# Patient Record
Sex: MALE | Race: WHITE | URBAN - METROPOLITAN AREA
[De-identification: names, ages, dates, MRNs, and addresses within clinical notes are randomized per-mention and may not be internally consistent; named-entity substitution may affect disease eponyms.]

---

## 2017-03-26 ENCOUNTER — INPATIENT (INPATIENT)
Facility: HOSPITAL | Age: 72
LOS: 4 days | Discharge: HOME | End: 2017-03-31
Attending: INTERNAL MEDICINE | Admitting: INTERNAL MEDICINE
Payer: MEDICARE

## 2017-03-26 VITALS
OXYGEN SATURATION: 98 % | SYSTOLIC BLOOD PRESSURE: 133 MMHG | HEART RATE: 71 BPM | HEIGHT: 69 IN | WEIGHT: 179.9 LBS | DIASTOLIC BLOOD PRESSURE: 76 MMHG | RESPIRATION RATE: 22 BRPM

## 2017-03-26 DIAGNOSIS — I26.99 OTHER PULMONARY EMBOLISM WITHOUT ACUTE COR PULMONALE: ICD-10-CM

## 2017-03-26 DIAGNOSIS — I48.91 UNSPECIFIED ATRIAL FIBRILLATION: ICD-10-CM

## 2017-03-26 LAB
ALBUMIN SERPL ELPH-MCNC: 3.3 G/DL — SIGNIFICANT CHANGE UP (ref 3.3–5)
ALP SERPL-CCNC: 72 U/L — SIGNIFICANT CHANGE UP (ref 40–120)
ALT FLD-CCNC: 50 U/L — SIGNIFICANT CHANGE UP (ref 12–78)
ANION GAP SERPL CALC-SCNC: 7 MMOL/L — SIGNIFICANT CHANGE UP (ref 5–17)
APPEARANCE UR: ABNORMAL
APTT BLD: 46.1 SEC — HIGH (ref 27.5–37.4)
APTT BLD: 61.8 SEC — HIGH (ref 27.5–37.4)
AST SERPL-CCNC: 31 U/L — SIGNIFICANT CHANGE UP (ref 15–37)
BACTERIA # UR AUTO: ABNORMAL
BASE EXCESS BLDA CALC-SCNC: 0.8 MMOL/L — SIGNIFICANT CHANGE UP (ref -2–2)
BASOPHILS # BLD AUTO: 0.1 K/UL — SIGNIFICANT CHANGE UP (ref 0–0.2)
BASOPHILS NFR BLD AUTO: 1 % — SIGNIFICANT CHANGE UP (ref 0–2)
BILIRUB SERPL-MCNC: 0.7 MG/DL — SIGNIFICANT CHANGE UP (ref 0.2–1.2)
BILIRUB UR-MCNC: NEGATIVE — SIGNIFICANT CHANGE UP
BLOOD GAS COMMENTS: SIGNIFICANT CHANGE UP
BLOOD GAS COMMENTS: SIGNIFICANT CHANGE UP
BLOOD GAS SOURCE: SIGNIFICANT CHANGE UP
BUN SERPL-MCNC: 26 MG/DL — HIGH (ref 7–23)
CALCIUM SERPL-MCNC: 8.5 MG/DL — SIGNIFICANT CHANGE UP (ref 8.5–10.1)
CHLORIDE SERPL-SCNC: 109 MMOL/L — HIGH (ref 96–108)
CK MB CFR SERPL CALC: 1.9 NG/ML — SIGNIFICANT CHANGE UP (ref 0.5–3.6)
CO2 SERPL-SCNC: 28 MMOL/L — SIGNIFICANT CHANGE UP (ref 22–31)
COLOR SPEC: YELLOW — SIGNIFICANT CHANGE UP
CREAT SERPL-MCNC: 1.12 MG/DL — SIGNIFICANT CHANGE UP (ref 0.5–1.3)
D DIMER BLD IA.RAPID-MCNC: 344 NG/ML DDU — HIGH
DIFF PNL FLD: ABNORMAL
DIGOXIN SERPL-MCNC: 2.16 NG/ML — HIGH (ref 0.8–2)
EOSINOPHIL # BLD AUTO: 0.1 K/UL — SIGNIFICANT CHANGE UP (ref 0–0.5)
EOSINOPHIL NFR BLD AUTO: 1.2 % — SIGNIFICANT CHANGE UP (ref 0–6)
EPI CELLS # UR: SIGNIFICANT CHANGE UP
GLUCOSE SERPL-MCNC: 106 MG/DL — HIGH (ref 70–99)
GLUCOSE UR QL: NEGATIVE MG/DL — SIGNIFICANT CHANGE UP
HCO3 BLDA-SCNC: 24 MMOL/L — SIGNIFICANT CHANGE UP (ref 21–29)
HCT VFR BLD CALC: 48.3 % — SIGNIFICANT CHANGE UP (ref 39–50)
HCT VFR BLD CALC: 48.7 % — SIGNIFICANT CHANGE UP (ref 39–50)
HGB BLD-MCNC: 16.1 G/DL — SIGNIFICANT CHANGE UP (ref 13–17)
HGB BLD-MCNC: 16.3 G/DL — SIGNIFICANT CHANGE UP (ref 13–17)
HOROWITZ INDEX BLDA+IHG-RTO: 21 — SIGNIFICANT CHANGE UP
INR BLD: 2.29 RATIO — HIGH (ref 0.88–1.16)
KETONES UR-MCNC: NEGATIVE — SIGNIFICANT CHANGE UP
LEUKOCYTE ESTERASE UR-ACNC: NEGATIVE — SIGNIFICANT CHANGE UP
LYMPHOCYTES # BLD AUTO: 2.1 K/UL — SIGNIFICANT CHANGE UP (ref 1–3.3)
LYMPHOCYTES # BLD AUTO: 25.9 % — SIGNIFICANT CHANGE UP (ref 13–44)
MAGNESIUM SERPL-MCNC: 2.3 MG/DL — SIGNIFICANT CHANGE UP (ref 1.8–2.4)
MCHC RBC-ENTMCNC: 30.6 PG — SIGNIFICANT CHANGE UP (ref 27–34)
MCHC RBC-ENTMCNC: 31.4 PG — SIGNIFICANT CHANGE UP (ref 27–34)
MCHC RBC-ENTMCNC: 33.1 GM/DL — SIGNIFICANT CHANGE UP (ref 32–36)
MCHC RBC-ENTMCNC: 33.8 GM/DL — SIGNIFICANT CHANGE UP (ref 32–36)
MCV RBC AUTO: 92.5 FL — SIGNIFICANT CHANGE UP (ref 80–100)
MCV RBC AUTO: 92.9 FL — SIGNIFICANT CHANGE UP (ref 80–100)
MONOCYTES # BLD AUTO: 0.6 K/UL — SIGNIFICANT CHANGE UP (ref 0–0.9)
MONOCYTES NFR BLD AUTO: 7.1 % — SIGNIFICANT CHANGE UP (ref 2–14)
NEUTROPHILS # BLD AUTO: 5.2 K/UL — SIGNIFICANT CHANGE UP (ref 1.8–7.4)
NEUTROPHILS NFR BLD AUTO: 64.8 % — SIGNIFICANT CHANGE UP (ref 43–77)
NITRITE UR-MCNC: NEGATIVE — SIGNIFICANT CHANGE UP
NT-PROBNP SERPL-SCNC: 4848 PG/ML — HIGH (ref 0–125)
PCO2 BLDA: 35 MMHG — SIGNIFICANT CHANGE UP (ref 32–46)
PH BLD: 7.45 — SIGNIFICANT CHANGE UP (ref 7.35–7.45)
PH UR: 6 — SIGNIFICANT CHANGE UP (ref 4.8–8)
PLATELET # BLD AUTO: 188 K/UL — SIGNIFICANT CHANGE UP (ref 150–400)
PLATELET # BLD AUTO: 190 K/UL — SIGNIFICANT CHANGE UP (ref 150–400)
PO2 BLDA: 76 MMHG — SIGNIFICANT CHANGE UP (ref 74–108)
POTASSIUM SERPL-MCNC: 4 MMOL/L — SIGNIFICANT CHANGE UP (ref 3.5–5.3)
POTASSIUM SERPL-SCNC: 4 MMOL/L — SIGNIFICANT CHANGE UP (ref 3.5–5.3)
PROT SERPL-MCNC: 6.3 GM/DL — SIGNIFICANT CHANGE UP (ref 6–8.3)
PROT UR-MCNC: 30 MG/DL
PROTHROM AB SERPL-ACNC: 25.4 SEC — HIGH (ref 9.8–12.7)
RBC # BLD: 5.2 M/UL — SIGNIFICANT CHANGE UP (ref 4.2–5.8)
RBC # BLD: 5.26 M/UL — SIGNIFICANT CHANGE UP (ref 4.2–5.8)
RBC # FLD: 13 % — SIGNIFICANT CHANGE UP (ref 11–15)
RBC # FLD: 13.2 % — SIGNIFICANT CHANGE UP (ref 11–15)
RBC CASTS # UR COMP ASSIST: >50 /HPF (ref 0–4)
SAO2 % BLDA: 95 % — SIGNIFICANT CHANGE UP (ref 92–96)
SODIUM SERPL-SCNC: 144 MMOL/L — SIGNIFICANT CHANGE UP (ref 135–145)
SP GR SPEC: 1.02 — SIGNIFICANT CHANGE UP (ref 1.01–1.02)
TROPONIN I SERPL-MCNC: 0.05 NG/ML — HIGH (ref 0.01–0.04)
UROBILINOGEN FLD QL: NEGATIVE MG/DL — SIGNIFICANT CHANGE UP
WBC # BLD: 7.4 K/UL — SIGNIFICANT CHANGE UP (ref 3.8–10.5)
WBC # BLD: 8 K/UL — SIGNIFICANT CHANGE UP (ref 3.8–10.5)
WBC # FLD AUTO: 7.4 K/UL — SIGNIFICANT CHANGE UP (ref 3.8–10.5)
WBC # FLD AUTO: 8 K/UL — SIGNIFICANT CHANGE UP (ref 3.8–10.5)
WBC UR QL: SIGNIFICANT CHANGE UP

## 2017-03-26 PROCEDURE — 71275 CT ANGIOGRAPHY CHEST: CPT | Mod: 26

## 2017-03-26 PROCEDURE — 99291 CRITICAL CARE FIRST HOUR: CPT

## 2017-03-26 PROCEDURE — 71010: CPT | Mod: 26

## 2017-03-26 RX ORDER — DIGOXIN 250 MCG
0.25 TABLET ORAL DAILY
Qty: 0 | Refills: 0 | Status: DISCONTINUED | OUTPATIENT
Start: 2017-03-26 | End: 2017-03-26

## 2017-03-26 RX ORDER — ATORVASTATIN CALCIUM 80 MG/1
20 TABLET, FILM COATED ORAL AT BEDTIME
Qty: 0 | Refills: 0 | Status: DISCONTINUED | OUTPATIENT
Start: 2017-03-26 | End: 2017-03-31

## 2017-03-26 RX ORDER — HEPARIN SODIUM 5000 [USP'U]/ML
3000 INJECTION INTRAVENOUS; SUBCUTANEOUS EVERY 6 HOURS
Qty: 0 | Refills: 0 | Status: DISCONTINUED | OUTPATIENT
Start: 2017-03-26 | End: 2017-03-26

## 2017-03-26 RX ORDER — HEPARIN SODIUM 5000 [USP'U]/ML
6500 INJECTION INTRAVENOUS; SUBCUTANEOUS EVERY 6 HOURS
Qty: 0 | Refills: 0 | Status: DISCONTINUED | OUTPATIENT
Start: 2017-03-26 | End: 2017-03-26

## 2017-03-26 RX ORDER — CLOPIDOGREL BISULFATE 75 MG/1
75 TABLET, FILM COATED ORAL DAILY
Qty: 0 | Refills: 0 | Status: DISCONTINUED | OUTPATIENT
Start: 2017-03-26 | End: 2017-03-29

## 2017-03-26 RX ORDER — HEPARIN SODIUM 5000 [USP'U]/ML
INJECTION INTRAVENOUS; SUBCUTANEOUS
Qty: 25000 | Refills: 0 | Status: DISCONTINUED | OUTPATIENT
Start: 2017-03-26 | End: 2017-03-26

## 2017-03-26 RX ORDER — RIVAROXABAN 15 MG-20MG
20 KIT ORAL DAILY
Qty: 0 | Refills: 0 | Status: DISCONTINUED | OUTPATIENT
Start: 2017-03-26 | End: 2017-03-31

## 2017-03-26 RX ORDER — ASPIRIN/CALCIUM CARB/MAGNESIUM 324 MG
81 TABLET ORAL DAILY
Qty: 0 | Refills: 0 | Status: DISCONTINUED | OUTPATIENT
Start: 2017-03-26 | End: 2017-03-31

## 2017-03-26 RX ORDER — LISINOPRIL 2.5 MG/1
2.5 TABLET ORAL DAILY
Qty: 0 | Refills: 0 | Status: DISCONTINUED | OUTPATIENT
Start: 2017-03-26 | End: 2017-03-31

## 2017-03-26 RX ORDER — ALPRAZOLAM 0.25 MG
0.25 TABLET ORAL DAILY
Qty: 0 | Refills: 0 | Status: DISCONTINUED | OUTPATIENT
Start: 2017-03-26 | End: 2017-03-31

## 2017-03-26 RX ORDER — HEPARIN SODIUM 5000 [USP'U]/ML
6500 INJECTION INTRAVENOUS; SUBCUTANEOUS ONCE
Qty: 0 | Refills: 0 | Status: COMPLETED | OUTPATIENT
Start: 2017-03-26 | End: 2017-03-26

## 2017-03-26 RX ORDER — CARVEDILOL PHOSPHATE 80 MG/1
6.25 CAPSULE, EXTENDED RELEASE ORAL EVERY 12 HOURS
Qty: 0 | Refills: 0 | Status: DISCONTINUED | OUTPATIENT
Start: 2017-03-26 | End: 2017-03-30

## 2017-03-26 RX ADMIN — CLOPIDOGREL BISULFATE 75 MILLIGRAM(S): 75 TABLET, FILM COATED ORAL at 18:37

## 2017-03-26 RX ADMIN — CARVEDILOL PHOSPHATE 6.25 MILLIGRAM(S): 80 CAPSULE, EXTENDED RELEASE ORAL at 18:37

## 2017-03-26 RX ADMIN — HEPARIN SODIUM 6500 UNIT(S): 5000 INJECTION INTRAVENOUS; SUBCUTANEOUS at 13:54

## 2017-03-26 RX ADMIN — HEPARIN SODIUM 1500 UNIT(S)/HR: 5000 INJECTION INTRAVENOUS; SUBCUTANEOUS at 13:57

## 2017-03-26 RX ADMIN — LISINOPRIL 2.5 MILLIGRAM(S): 2.5 TABLET ORAL at 18:37

## 2017-03-26 RX ADMIN — Medication 0.25 MILLIGRAM(S): at 18:37

## 2017-03-26 RX ADMIN — Medication 81 MILLIGRAM(S): at 21:21

## 2017-03-26 NOTE — ED PROVIDER NOTE - PHYSICAL EXAMINATION
Gen: Alert, NAD  Head: NC, AT   Eyes: PERRL, EOMI, normal lids/conjunctiva  ENT: normal hearing, patent oropharynx without erythema/exudate, uvula midline  Neck: supple, no tenderness, Trachea midline  Pulm: Bilateral BS, normal resp effort, no wheeze/stridor/retractions  CV: irregular, tachycardic. distal pulses intact   Abd: soft, NT/ND, +BS, no hepatosplenomegaly  Mskel: extremities x4 with normal ROM and no joint effusions. no ctl spine ttp.   Skin: no rash, no bruising   Neuro: AAOx3, no sensory/motor deficits, CN 2-12 intact

## 2017-03-26 NOTE — ED ADULT NURSE REASSESSMENT NOTE - NS ED NURSE REASSESS COMMENT FT1
Heparin discontinued and PO medications given. Pt remains on the monitor Sinus Tachycardia  pain or SOB noted at this time

## 2017-03-26 NOTE — H&P ADULT. - HISTORY OF PRESENT ILLNESS
72m hx of afib noncompliant with xarelto and cad not on any meds pw sob that he has had for 2 years that is steadily worsening. he denies cough, fever, or other uri symptoms. No cp or abd pain. Feels particularly worse when he bends over.   Fh and Sh not otherwise contributory

## 2017-03-26 NOTE — ED ADULT NURSE NOTE - OBJECTIVE STATEMENT
can't breathe, and Monday I went to see my doctor and he gave me zyrtec, Claritin and Flonase He was having chest pains but has resolved. He has a history of Atrial Fibrillation and Stent x1 .He was taking Plavix but stop for 6 to 7 months . The started Xarelto. He can't  breath reason for today visit

## 2017-03-26 NOTE — ED ADULT NURSE NOTE - CHIEF COMPLAINT QUOTE
"  I have been feeling like that for about 2 yeas, I can't breathe, and Monday I went to see my doctor and he gave me zyrtec, claritin and flonase and this morning I got up and I felt horrible, I was here 2 years for the same reasons that I am here today and they took to [facility] and I got a cardiac stent, they told me that I got afib, I have a little bit of chest pain now"

## 2017-03-26 NOTE — ED PROVIDER NOTE - MEDICAL DECISION MAKING DETAILS
patient pw afib w rvr. no clear compliance with ac. will need to start dilt to bring rate down. I suspect that is the nature of sob. will give ac. will admit to telemetry. patient pw afib w rvr. no clear compliance with ac. will need to start dilt to bring rate down. I suspect that is the nature of sob. will give ac. cta shows PE. INR already elevated. Will will admit to telemetry.

## 2017-03-26 NOTE — ED PROVIDER NOTE - CARE PLAN
Principal Discharge DX:	Persistent atrial fibrillation Principal Discharge DX:	Persistent atrial fibrillation  Secondary Diagnosis:	Other acute pulmonary embolism without acute cor pulmonale

## 2017-03-26 NOTE — ED ADULT TRIAGE NOTE - CHIEF COMPLAINT QUOTE
"  I have been feeling like that for about 2 yeas, I can't breathe, and Monday I went to see my doctor and he gave me zyrtec, claritin and flonase and this morning I got up and I felt horrible, I was here 2 years for the same reasons that I am here today and they took to [facility] and I got a cardiac stent, they told me that I got afib, I have a little bit of chest pain now"

## 2017-03-27 LAB
ANION GAP SERPL CALC-SCNC: 9 MMOL/L — SIGNIFICANT CHANGE UP (ref 5–17)
BUN SERPL-MCNC: 23 MG/DL — SIGNIFICANT CHANGE UP (ref 7–23)
CALCIUM SERPL-MCNC: 8.2 MG/DL — LOW (ref 8.5–10.1)
CHLORIDE SERPL-SCNC: 108 MMOL/L — SIGNIFICANT CHANGE UP (ref 96–108)
CO2 SERPL-SCNC: 28 MMOL/L — SIGNIFICANT CHANGE UP (ref 22–31)
CREAT SERPL-MCNC: 0.98 MG/DL — SIGNIFICANT CHANGE UP (ref 0.5–1.3)
GLUCOSE SERPL-MCNC: 105 MG/DL — HIGH (ref 70–99)
HCT VFR BLD CALC: 43.2 % — SIGNIFICANT CHANGE UP (ref 39–50)
HGB BLD-MCNC: 14.8 G/DL — SIGNIFICANT CHANGE UP (ref 13–17)
MAGNESIUM SERPL-MCNC: 2.4 MG/DL — SIGNIFICANT CHANGE UP (ref 1.8–2.4)
MCHC RBC-ENTMCNC: 31.5 PG — SIGNIFICANT CHANGE UP (ref 27–34)
MCHC RBC-ENTMCNC: 34.3 GM/DL — SIGNIFICANT CHANGE UP (ref 32–36)
MCV RBC AUTO: 91.9 FL — SIGNIFICANT CHANGE UP (ref 80–100)
PHOSPHATE SERPL-MCNC: 3.2 MG/DL — SIGNIFICANT CHANGE UP (ref 2.5–4.5)
PLATELET # BLD AUTO: 175 K/UL — SIGNIFICANT CHANGE UP (ref 150–400)
POTASSIUM SERPL-MCNC: 3.8 MMOL/L — SIGNIFICANT CHANGE UP (ref 3.5–5.3)
POTASSIUM SERPL-SCNC: 3.8 MMOL/L — SIGNIFICANT CHANGE UP (ref 3.5–5.3)
RBC # BLD: 4.7 M/UL — SIGNIFICANT CHANGE UP (ref 4.2–5.8)
RBC # FLD: 13 % — SIGNIFICANT CHANGE UP (ref 11–15)
SODIUM SERPL-SCNC: 145 MMOL/L — SIGNIFICANT CHANGE UP (ref 135–145)
TROPONIN I SERPL-MCNC: 0.04 NG/ML — SIGNIFICANT CHANGE UP (ref 0.01–0.04)
WBC # BLD: 6.4 K/UL — SIGNIFICANT CHANGE UP (ref 3.8–10.5)
WBC # FLD AUTO: 6.4 K/UL — SIGNIFICANT CHANGE UP (ref 3.8–10.5)

## 2017-03-27 RX ADMIN — Medication 81 MILLIGRAM(S): at 11:38

## 2017-03-27 RX ADMIN — ATORVASTATIN CALCIUM 20 MILLIGRAM(S): 80 TABLET, FILM COATED ORAL at 22:18

## 2017-03-27 RX ADMIN — CARVEDILOL PHOSPHATE 6.25 MILLIGRAM(S): 80 CAPSULE, EXTENDED RELEASE ORAL at 06:01

## 2017-03-27 RX ADMIN — RIVAROXABAN 20 MILLIGRAM(S): KIT at 12:00

## 2017-03-27 RX ADMIN — LISINOPRIL 2.5 MILLIGRAM(S): 2.5 TABLET ORAL at 06:01

## 2017-03-27 RX ADMIN — CLOPIDOGREL BISULFATE 75 MILLIGRAM(S): 75 TABLET, FILM COATED ORAL at 11:38

## 2017-03-27 NOTE — PROGRESS NOTE ADULT - SUBJECTIVE AND OBJECTIVE BOX
Patient is a 72y old  Male who presents with a chief complaint of dyspnea (26 Mar 2017 16:59)  PE , subsegmental on CTA of chest. still has chest "congestion"    INTERVAL HPI/OVERNIGHT EVENTS: uneventful. .     MEDICATIONS  (STANDING):  aspirin enteric coated 81milliGRAM(s) Oral daily  lisinopril 2.5milliGRAM(s) Oral daily  diltiazem    Tablet 60milliGRAM(s) Oral three times a day  rivaroxaban 20milliGRAM(s) Oral daily  atorvastatin 20milliGRAM(s) Oral at bedtime  clopidogrel Tablet 75milliGRAM(s) Oral daily  carvedilol 6.25milliGRAM(s) Oral every 12 hours    MEDICATIONS  (PRN):  ALPRAZolam 0.25milliGRAM(s) Oral daily PRN anxiety      Allergies    No Known Allergies    Intolerances        REVIEW OF SYSTEMS:  CONSTITUTIONAL: No fever, weight loss, or fatigue  EYES: No eye pain, visual disturbances, or discharge  ENMT:  No difficulty hearing, tinnitus, vertigo; No sinus or throat pain  NECK: No pain or stiffness  BREASTS: No pain, masses, or nipple discharge  RESPIRATORY: No cough, wheezing, chills or hemoptysis; No shortness of breath  CARDIOVASCULAR: No chest pain, palpitations, dizziness, or leg swelling  GASTROINTESTINAL: No abdominal or epigastric pain. No nausea, vomiting, or hematemesis; No diarrhea or constipation. No melena or hematochezia.  GENITOURINARY: No dysuria, frequency, hematuria, or incontinence  NEUROLOGICAL: No headaches, memory loss, loss of strength, numbness, or tremors  SKIN: No itching, burning, rashes, or lesions   LYMPH NODES: No enlarged glands  ENDOCRINE: No heat or cold intolerance; No hair loss  MUSCULOSKELETAL: No joint pain or swelling; No muscle, back, or extremity pain  PSYCHIATRIC: No depression, anxiety, mood swings, or difficulty sleeping  HEME/LYMPH: No easy bruising, or bleeding gums  ALLERY AND IMMUNOLOGIC: No hives or eczema    Vital Signs Last 24 Hrs  T(C): 36.4, Max: 37 ( @ 00:21)  T(F): 97.5, Max: 98.6 ( @ 00:21)  HR: 72 (51 - 117)  BP: 96/61 (96/61 - 151/51)  BP(mean): --  RR: 17 (17 - 23)  SpO2: 90% (90% - 99%)    PHYSICAL EXAM:  GENERAL: NAD, well-groomed, well-developed  HEAD:  Atraumatic, Normocephalic  EYES: EOMI, PERRLA, conjunctiva and sclera clear  ENMT: No tonsillar erythema, exudates, or enlargement; Moist mucous membranes, Good dentition, No lesions  NECK: Supple, No JVD, Normal thyroid  NERVOUS SYSTEM:  Alert & Oriented X3, Good concentration; Motor Strength 5/5 B/L upper and lower extremities; DTRs 2+ intact and symmetric  CHEST/LUNG: Clear to percussion bilaterally; No rales, rhonchi, wheezing, or rubs  HEART: Regular rate and rhythm; No murmurs, rubs, or gallops  ABDOMEN: Soft, Nontender, Nondistended; Bowel sounds present  EXTREMITIES:  2+ Peripheral Pulses, No clubbing, cyanosis, or edema  LYMPH: No lymphadenopathy noted  SKIN: No rashes or lesions    LABS:                        14.8   6.4   )-----------( 175      ( 27 Mar 2017 06:14 )             43.2     27 Mar 2017 06:14    145    |  108    |  23     ----------------------------<  105    3.8     |  28     |  0.98     Ca    8.2        27 Mar 2017 06:14  Phos  3.2       27 Mar 2017 06:14  Mg     2.4       27 Mar 2017 06:14    TPro  6.3    /  Alb  3.3    /  TBili  0.7    /  DBili  x      /  AST  31     /  ALT  50     /  AlkPhos  72     26 Mar 2017 13:47      PT/INR - ( 26 Mar 2017 13:47 )   PT: 25.4 sec;   INR: 2.29 ratio         PTT - ( 26 Mar 2017 21:09 )  PTT:61.8 sec  Urinalysis Basic - ( 26 Mar 2017 15:34 )    Color: Yellow / Appearance: Slightly Turbid / S.020 / pH: x  Gluc: x / Ketone: Negative  / Bili: Negative / Urobili: Negative mg/dL   Blood: x / Protein: 30 mg/dL / Nitrite: Negative   Leuk Esterase: Negative / RBC: >50 /HPF / WBC 0-2   Sq Epi: x / Non Sq Epi: Occasional / Bacteria: Few      CAPILLARY BLOOD GLUCOSE    ABG - ( 26 Mar 2017 13:49 )  pH: x     /  pCO2: 35    /  pO2: 76    / HCO3: 24    / Base Excess: 0.8   /  SaO2: 95                CARDIAC MARKERS ( 27 Mar 2017 06:14 )  .043 ng/mL / x     / x     / x     / x      CARDIAC MARKERS ( 26 Mar 2017 13:47 )  .048 ng/mL / x     / x     / x     / 1.9 ng/mL            RADIOLOGY & ADDITIONAL TESTS:    Imaging Personally Reviewed:  [ ] YES  [ ] NO    Consultant(s) Notes Reviewed:  [ ] YES  [ ] NO    Care Discussed with Consultants/Other Providers [ ] YES  [ ] NO    Care discussed with family,         [  ]   yes  [  ]  No    imp:    stable[ ]    unstable[  ]     improving [   ]       unchanged  [  ]                Plans:  Continue present plans  [ x ] continue ACT               New consult [  ]   specialty  ......Pulmonary consult.               order test[  ]    test name.                  Discharge Planning  [  ]

## 2017-03-27 NOTE — CHART NOTE - NSCHARTNOTEFT_GEN_A_CORE
Medicine Hospitalist PA    Asked to see pt by RN for sob. Pt was seen and examined at bedside eating breakfast comfortably. Pt states he only feels sob when laying down. He states he feels better while sitting in a chair and walking. Pt states he has no sob at this moment and feels better. He refuses oxygen via NC, he states it is uncomfortable however the importance of oxygen was explained to pt and he states he will wear it after breakfast. Pt denies cp, sob, fever. N/V, palpitations.     VS: BP: 101/72  HR: 87  O2: 96    General: No distress, alert, awake and oriented x 3  CV: S1 S2+, irreg  Resp: CTA b/l    A/P: Continue ambulation  - Continue oxygen via NC  - Continue to monitor

## 2017-03-27 NOTE — CHART NOTE - NSCHARTNOTEFT_GEN_A_CORE
Hospitalist Medicine PA    Called by RN to assess patient for anxiety. Patient seen and examined. Patient states he does not regularly take xanax or other anxiety medications and does not wish to at this time. Patient is unable to describe clearly what he feels, but states that for the past few months at 2 in the morning he feels like he cannot sit still and must get up to walk around.   Patient also states he has not been taking Xarelto as prescribed since 2 years ago as well as ASA/Plavix and Lipitor.   Denies CP, palpitations, sob.    General: Patient was seen sitting in chair, comfortable, in NAD.   Lungs: CTA b/l  Heart: s1s2, irreg.    Discussed with patient the importance of continuing to take these medications for his heart.  Will continue to monitor patient.  -continue medical management/supportive care.

## 2017-03-27 NOTE — PHARMACY COMMUNICATION NOTE - COMMENTS
Spoke with MD about Xarelto induction dose (15mg twice daily) versus 20mg oral daily since patient presents with acute PE/non-compliance. Clarified the dosing regimen, and MD wants to continue xarelto 20mg PO daily.

## 2017-03-27 NOTE — PROGRESS NOTE ADULT - SUBJECTIVE AND OBJECTIVE BOX
INTERVAL HPI/OVERNIGHT EVENTS:  72m hx of FABIANAfib noncompliant with xarelto and CAD with stent,  not on any meds came with sob that he has had for 2 years. That is steadily worsening for last few weeks. Reports having Orthopnea. Denies any fever , chills. Some times has chest discomfort. Admitted with PE.  Comfortable in bed , complains of exertional sob.    Vital Signs Last 24 Hrs  T(C): 36.1, Max: 37 ( @ 00:21)  T(F): 97, Max: 98.6 ( @ 00:21)  HR: 80 (62 - 117)  BP: 133/72 (92/56 - 151/51)  BP(mean): --  RR: 19 (17 - 19)  SpO2: 97% (90% - 99%)    PHYSICAL EXAM:  GEN:        Awake, responsive and comfortable.  HEENT:    Normal.    RESP:      no wheezing  CVS:         Regular rate and rhythm.   ABD:         Soft, non-tender, non-distended;   :             No costovertebral angle tenderness  EXTR:            No clubbing, cyanosis or edema  CNS:              Intact sensory and motor function.    MEDICATIONS  (STANDING):  aspirin enteric coated 81milliGRAM(s) Oral daily  lisinopril 2.5milliGRAM(s) Oral daily  diltiazem    Tablet 60milliGRAM(s) Oral three times a day  rivaroxaban 20milliGRAM(s) Oral daily  atorvastatin 20milliGRAM(s) Oral at bedtime  clopidogrel Tablet 75milliGRAM(s) Oral daily  carvedilol 6.25milliGRAM(s) Oral every 12 hours    MEDICATIONS  (PRN):  ALPRAZolam 0.25milliGRAM(s) Oral daily PRN anxiety    LABS:                        14.8   6.4   )-----------( 175      ( 27 Mar 2017 06:14 )             43.2     27 Mar 2017 06:14    145    |  108    |  23     ----------------------------<  105    3.8     |  28     |  0.98     Ca    8.2        27 Mar 2017 06:14  Phos  3.2       27 Mar 2017 06:14  Mg     2.4       27 Mar 2017 06:14    TPro  6.3    /  Alb  3.3    /  TBili  0.7    /  DBili  x      /  AST  31     /  ALT  50     /  AlkPhos  72     26 Mar 2017 13:47    PT/INR - ( 26 Mar 2017 13:47 )   PT: 25.4 sec;   INR: 2.29 ratio         PTT - ( 26 Mar 2017 21:09 )  PTT:61.8 sec    Urinalysis Basic - ( 26 Mar 2017 15:34 )    Color: Yellow / Appearance: Slightly Turbid / S.020 / pH: x  Gluc: x / Ketone: Negative  / Bili: Negative / Urobili: Negative mg/dL   Blood: x / Protein: 30 mg/dL / Nitrite: Negative   Leuk Esterase: Negative / RBC: >50 /HPF / WBC 0-2   Sq Epi: x / Non Sq Epi: Occasional / Bacteria: Few    ASSESSMENT AND PLAN:  ·	SOB.  ·	Pulmonary embolism.  ·	Suspect CHF.  ·	CAD with stent.  ·	A Fib.  ·	Possible COPD component.  ·	Non adherence with treatment.    Continue treatment.  Follow echocardiogram.  Consider Cardiology evaluation.

## 2017-03-28 ENCOUNTER — APPOINTMENT (OUTPATIENT)
Dept: CARDIOLOGY | Facility: CLINIC | Age: 72
End: 2017-03-28

## 2017-03-28 LAB
DIGOXIN SERPL-MCNC: 0.29 NG/ML — LOW (ref 0.8–2)
HCT VFR BLD CALC: 45.8 % — SIGNIFICANT CHANGE UP (ref 39–50)
HGB BLD-MCNC: 15.7 G/DL — SIGNIFICANT CHANGE UP (ref 13–17)
MCHC RBC-ENTMCNC: 31.6 PG — SIGNIFICANT CHANGE UP (ref 27–34)
MCHC RBC-ENTMCNC: 34.2 GM/DL — SIGNIFICANT CHANGE UP (ref 32–36)
MCV RBC AUTO: 92.6 FL — SIGNIFICANT CHANGE UP (ref 80–100)
NT-PROBNP SERPL-SCNC: 2532 PG/ML — HIGH (ref 0–125)
PLATELET # BLD AUTO: 167 K/UL — SIGNIFICANT CHANGE UP (ref 150–400)
RBC # BLD: 4.95 M/UL — SIGNIFICANT CHANGE UP (ref 4.2–5.8)
RBC # FLD: 13.3 % — SIGNIFICANT CHANGE UP (ref 11–15)
WBC # BLD: 6.6 K/UL — SIGNIFICANT CHANGE UP (ref 3.8–10.5)
WBC # FLD AUTO: 6.6 K/UL — SIGNIFICANT CHANGE UP (ref 3.8–10.5)

## 2017-03-28 RX ORDER — DIGOXIN 250 MCG
0.12 TABLET ORAL DAILY
Qty: 0 | Refills: 0 | Status: DISCONTINUED | OUTPATIENT
Start: 2017-03-28 | End: 2017-03-31

## 2017-03-28 RX ADMIN — LISINOPRIL 2.5 MILLIGRAM(S): 2.5 TABLET ORAL at 06:37

## 2017-03-28 RX ADMIN — Medication 81 MILLIGRAM(S): at 12:08

## 2017-03-28 RX ADMIN — CLOPIDOGREL BISULFATE 75 MILLIGRAM(S): 75 TABLET, FILM COATED ORAL at 12:08

## 2017-03-28 RX ADMIN — RIVAROXABAN 20 MILLIGRAM(S): KIT at 12:08

## 2017-03-28 RX ADMIN — CARVEDILOL PHOSPHATE 6.25 MILLIGRAM(S): 80 CAPSULE, EXTENDED RELEASE ORAL at 19:02

## 2017-03-28 RX ADMIN — CARVEDILOL PHOSPHATE 6.25 MILLIGRAM(S): 80 CAPSULE, EXTENDED RELEASE ORAL at 06:32

## 2017-03-28 NOTE — PROGRESS NOTE ADULT - SUBJECTIVE AND OBJECTIVE BOX
Patient is a 72y old  Male who presents with a chief complaint of dyspnea (26 Mar 2017 16:59)  ischemic cardiomyopathy , LVEF 25%, Chronic a,fib, PE, CAD    INTERVAL HPI/OVERNIGHT EVENTS:unevenful    MEDICATIONS  (STANDING):  aspirin enteric coated 81milliGRAM(s) Oral daily  lisinopril 2.5milliGRAM(s) Oral daily  rivaroxaban 20milliGRAM(s) Oral daily  atorvastatin 20milliGRAM(s) Oral at bedtime  clopidogrel Tablet 75milliGRAM(s) Oral daily  carvedilol 6.25milliGRAM(s) Oral every 12 hours  diltiazem    Tablet 30milliGRAM(s) Oral three times a day  digoxin     Tablet 0.125milliGRAM(s) Oral daily    MEDICATIONS  (PRN):  ALPRAZolam 0.25milliGRAM(s) Oral daily PRN anxiety      Allergies    No Known Allergies    Intolerances        REVIEW OF SYSTEMS:  CONSTITUTIONAL: No fever, weight loss, or fatigue  EYES: No eye pain, visual disturbances, or discharge  ENMT:  No difficulty hearing, tinnitus, vertigo; No sinus or throat pain  NECK: No pain or stiffness  BREASTS: No pain, masses, or nipple discharge  RESPIRATORY: No cough, wheezing, chills or hemoptysis; No shortness of breath  CARDIOVASCULAR: No chest pain, palpitations, dizziness, or leg swelling  GASTROINTESTINAL: No abdominal or epigastric pain. No nausea, vomiting, or hematemesis; No diarrhea or constipation. No melena or hematochezia.  GENITOURINARY: No dysuria, frequency, hematuria, or incontinence  NEUROLOGICAL: No headaches, memory loss, loss of strength, numbness, or tremors  SKIN: No itching, burning, rashes, or lesions   LYMPH NODES: No enlarged glands  ENDOCRINE: No heat or cold intolerance; No hair loss  MUSCULOSKELETAL: No joint pain or swelling; No muscle, back, or extremity pain  PSYCHIATRIC: No depression, anxiety, mood swings, or difficulty sleeping  HEME/LYMPH: No easy bruising, or bleeding gums  ALLERY AND IMMUNOLOGIC: No hives or eczema    Vital Signs Last 24 Hrs  T(C): 36.6, Max: 36.9 ( @ 01:04)  T(F): 97.8, Max: 98.4 ( @ 01:04)  HR: 94 (73 - 94)  BP: 126/84 (92/56 - 133/72)  BP(mean): --  RR: 18 (18 - 19)  SpO2: 94% (94% - 97%)    PHYSICAL EXAM:  GENERAL: NAD, well-groomed, well-developed  HEAD:  Atraumatic, Normocephalic  EYES: EOMI, PERRLA, conjunctiva and sclera clear  ENMT: No tonsillar erythema, exudates, or enlargement; Moist mucous membranes, Good dentition, No lesions  NECK: Supple, No JVD, Normal thyroid  NERVOUS SYSTEM:  Alert & Oriented X3, Good concentration; Motor Strength 5/5 B/L upper and lower extremities; DTRs 2+ intact and symmetric  CHEST/LUNG: Clear to percussion bilaterally; No rales, rhonchi, wheezing, or rubs  HEART: Regular rate and rhythm; No murmurs, rubs, or gallops  ABDOMEN: Soft, Nontender, Nondistended; Bowel sounds present  EXTREMITIES:  2+ Peripheral Pulses, No clubbing, cyanosis, or edema  LYMPH: No lymphadenopathy noted  SKIN: No rashes or lesions    LABS:                        15.7   6.6   )-----------( 167      ( 28 Mar 2017 07:08 )             45.8     27 Mar 2017 06:14    145    |  108    |  23     ----------------------------<  105    3.8     |  28     |  0.98     Ca    8.2        27 Mar 2017 06:14  Phos  3.2       27 Mar 2017 06:14  Mg     2.4       27 Mar 2017 06:14    TPro  6.3    /  Alb  3.3    /  TBili  0.7    /  DBili  x      /  AST  31     /  ALT  50     /  AlkPhos  72     26 Mar 2017 13:47      PT/INR - ( 26 Mar 2017 13:47 )   PT: 25.4 sec;   INR: 2.29 ratio         PTT - ( 26 Mar 2017 21:09 )  PTT:61.8 sec  Urinalysis Basic - ( 26 Mar 2017 15:34 )    Color: Yellow / Appearance: Slightly Turbid / S.020 / pH: x  Gluc: x / Ketone: Negative  / Bili: Negative / Urobili: Negative mg/dL   Blood: x / Protein: 30 mg/dL / Nitrite: Negative   Leuk Esterase: Negative / RBC: >50 /HPF / WBC 0-2   Sq Epi: x / Non Sq Epi: Occasional / Bacteria: Few      CAPILLARY BLOOD GLUCOSE    ABG - ( 26 Mar 2017 13:49 )  pH: x     /  pCO2: 35    /  pO2: 76    / HCO3: 24    / Base Excess: 0.8   /  SaO2: 95                CARDIAC MARKERS ( 27 Mar 2017 06:14 )  .043 ng/mL / x     / x     / x     / x      CARDIAC MARKERS ( 26 Mar 2017 13:47 )  .048 ng/mL / x     / x     / x     / 1.9 ng/mL            RADIOLOGY & ADDITIONAL TESTS:    Imaging Personally Reviewed:  [ ] YES  [ ] NO    Consultant(s) Notes Reviewed:  [x ] YES  [ ] NO    Care Discussed with Consultants/Other Providers [ ] YES  [ ] NO    Care discussed with family,         [  ]   yes  [  ]  No    imp:    stable[ ]    unstable[  ]     improving [ x  ]       unchanged  [  ]                Plans:  Continue present plans  [x  ] as per cardiology, Pulmonary               New consult [  ]   specialty  .......               order test[  ]    test name.                  Discharge Planning  [  ] Patient is a 72y old  Male who presents with a chief complaint of dyspnea (26 Mar 2017 16:59)  ischemic cardiomyopathy , LVEF 25%, Chronic a,fib, PE, CAD    INTERVAL HPI/OVERNIGHT EVENTS:unevenful    MEDICATIONS  (STANDING):  aspirin enteric coated 81milliGRAM(s) Oral daily  lisinopril 2.5milliGRAM(s) Oral daily  rivaroxaban 20milliGRAM(s) Oral daily  atorvastatin 20milliGRAM(s) Oral at bedtime  clopidogrel Tablet 75milliGRAM(s) Oral daily  carvedilol 6.25milliGRAM(s) Oral every 12 hours  diltiazem    Tablet 30milliGRAM(s) Oral three times a day  digoxin     Tablet 0.125milliGRAM(s) Oral daily    MEDICATIONS  (PRN):  ALPRAZolam 0.25milliGRAM(s) Oral daily PRN anxiety      Allergies    No Known Allergies    Intolerances        REVIEW OF SYSTEMS:  CONSTITUTIONAL: No fever, weight loss, or fatigue  EYES: No eye pain, visual disturbances, or discharge  ENMT:  No difficulty hearing, tinnitus, vertigo; No sinus or throat pain  NECK: No pain or stiffness  BREASTS: No pain, masses, or nipple discharge  RESPIRATORY: No cough, wheezing, chills or hemoptysis;  shortness of breath. rales present at bases  CARDIOVASCULAR: No chest pain, palpitations, dizziness, or leg swelling  GASTROINTESTINAL: No abdominal or epigastric pain. No nausea, vomiting, or hematemesis; No diarrhea or constipation. No melena or hematochezia.  GENITOURINARY: No dysuria, frequency, hematuria, or incontinence  NEUROLOGICAL: No headaches, memory loss, loss of strength, numbness, or tremors  SKIN: No itching, burning, rashes, or lesions   LYMPH NODES: No enlarged glands  ENDOCRINE: No heat or cold intolerance; No hair loss  MUSCULOSKELETAL: No joint pain or swelling; No muscle, back, or extremity pain  PSYCHIATRIC: No depression, anxiety, mood swings, or difficulty sleeping  HEME/LYMPH: No easy bruising, or bleeding gums  ALLERY AND IMMUNOLOGIC: No hives or eczema    Vital Signs Last 24 Hrs  T(C): 36.6, Max: 36.9 ( @ 01:04)  T(F): 97.8, Max: 98.4 ( @ 01:04)  HR: 94 (73 - 94)  BP: 126/84 (92/56 - 133/72)  BP(mean): --  RR: 18 (18 - 19)  SpO2: 94% (94% - 97%)    PHYSICAL EXAM:  GENERAL: NAD, well-groomed, well-developed  HEAD:  Atraumatic, Normocephalic  EYES: EOMI, PERRLA, conjunctiva and sclera clear  ENMT: No tonsillar erythema, exudates, or enlargement; Moist mucous membranes, Good dentition, No lesions  NECK: Supple, No JVD, Normal thyroid  NERVOUS SYSTEM:  Alert & Oriented X3, Good concentration; Motor Strength 5/5 B/L upper and lower extremities; DTRs 2+ intact and symmetric  CHEST/LUNG: Clear to percussion bilaterally;  rales present at bases, rhonchi, wheezing, or rubs  HEART: Regular rate and rhythm; No murmurs, rubs, or gallops  ABDOMEN: Soft, Nontender, Nondistended; Bowel sounds present  EXTREMITIES:  2+ Peripheral Pulses, No clubbing, cyanosis, or edema  LYMPH: No lymphadenopathy noted  SKIN: No rashes or lesions    LABS:                        15.7   6.6   )-----------( 167      ( 28 Mar 2017 07:08 )             45.8     27 Mar 2017 06:14    145    |  108    |  23     ----------------------------<  105    3.8     |  28     |  0.98     Ca    8.2        27 Mar 2017 06:14  Phos  3.2       27 Mar 2017 06:14  Mg     2.4       27 Mar 2017 06:14    TPro  6.3    /  Alb  3.3    /  TBili  0.7    /  DBili  x      /  AST  31     /  ALT  50     /  AlkPhos  72     26 Mar 2017 13:47      PT/INR - ( 26 Mar 2017 13:47 )   PT: 25.4 sec;   INR: 2.29 ratio         PTT - ( 26 Mar 2017 21:09 )  PTT:61.8 sec  Urinalysis Basic - ( 26 Mar 2017 15:34 )    Color: Yellow / Appearance: Slightly Turbid / S.020 / pH: x  Gluc: x / Ketone: Negative  / Bili: Negative / Urobili: Negative mg/dL   Blood: x / Protein: 30 mg/dL / Nitrite: Negative   Leuk Esterase: Negative / RBC: >50 /HPF / WBC 0-2   Sq Epi: x / Non Sq Epi: Occasional / Bacteria: Few      CAPILLARY BLOOD GLUCOSE    ABG - ( 26 Mar 2017 13:49 )  pH: x     /  pCO2: 35    /  pO2: 76    / HCO3: 24    / Base Excess: 0.8   /  SaO2: 95                CARDIAC MARKERS ( 27 Mar 2017 06:14 )  .043 ng/mL / x     / x     / x     / x      CARDIAC MARKERS ( 26 Mar 2017 13:47 )  .048 ng/mL / x     / x     / x     / 1.9 ng/mL            RADIOLOGY & ADDITIONAL TESTS:    Imaging Personally Reviewed:  [ ] YES  [ ] NO    Consultant(s) Notes Reviewed:  [x ] YES  [ ] NO    Care Discussed with Consultants/Other Providers [ ] YES  [ ] NO    Care discussed with family,         [  ]   yes  [  ]  No    imp:    stable[ ]    unstable[  ]     improving [ x  ]       unchanged  [  ]                Plans:  Continue present plans  [x  ] as per cardiology, Pulmonary               New consult [  ]   specialty  .......               order test[  ]    test name.                  Discharge Planning  [  ]

## 2017-03-28 NOTE — PROGRESS NOTE ADULT - SUBJECTIVE AND OBJECTIVE BOX
INTERVAL HPI/OVERNIGHT EVENTS:  72m hx of FABIANAfib noncompliant with xarelto and CAD with stent,  not on any meds came with sob that he has had for 2 years. That is steadily worsening for last few weeks. Reports having Orthopnea. Denies any fever , chills. Some times has chest discomfort. Admitted with PE.  Seen by Cardiology.  Comfortable in bed , complains of exertional sob.    Vital Signs Last 24 Hrs  T(C): 36.8, Max: 36.9 ( @ 01:04)  T(F): 98.2, Max: 98.4 ( @ 01:04)  HR: 59 (59 - 94)  BP: 141/78 (103/70 - 141/78)  BP(mean): --  RR: 18 (18 - 18)  SpO2: 98% (94% - 98%)    PHYSICAL EXAM:  GEN:         Awake, responsive and comfortable.  HEENT:    Normal.    RESP:   CVS:             Regular rate and rhythm.   ABD:         Soft, non-tender, non-distended;   :             No costovertebral angle tenderness  EXTR:            No clubbing, cyanosis or edema  CNS:              Intact sensory and motor function.    MEDICATIONS  (STANDING):  aspirin enteric coated 81milliGRAM(s) Oral daily  lisinopril 2.5milliGRAM(s) Oral daily  rivaroxaban 20milliGRAM(s) Oral daily  atorvastatin 20milliGRAM(s) Oral at bedtime  clopidogrel Tablet 75milliGRAM(s) Oral daily  carvedilol 6.25milliGRAM(s) Oral every 12 hours  diltiazem    Tablet 30milliGRAM(s) Oral three times a day  digoxin     Tablet 0.125milliGRAM(s) Oral daily    MEDICATIONS  (PRN):  ALPRAZolam 0.25milliGRAM(s) Oral daily PRN anxiety    LABS:                        15.7   6.6   )-----------( 167      ( 28 Mar 2017 07:08 )             45.8     27 Mar 2017 06:14    145    |  108    |  23     ----------------------------<  105    3.8     |  28     |  0.98     Ca    8.2        27 Mar 2017 06:14  Phos  3.2       27 Mar 2017 06:14  Mg     2.4       27 Mar 2017 06:14      PTT - ( 26 Mar 2017 21:09 )  PTT:61.8 sec    RADIOLOGY & ADDITIONAL STUDIES:     EXAM:  TTE WO CON COMPLETE W DOPPL         PROCEDURE DATE:  2017        INTERPRETATION:  REPORT:    TRANSTHORACIC ECHOCARDIOGRAM REPORT           Patient Name:   MARÍA MARY Patient Location: [facility] Rec #:  [id number]        Accession #:      [id number]  Account #:                        Height:           70.1 in 178.0 cm  YOB: 1945         Weight:           185.2 lb 84.00 kg  Patient Age:    72 years          BSA:              2.02 m²  Patient Gender: M      BP:               151/57 mmHg        Date of Exam: 3/27/2017 2:40:32 PM  Sonographer:  MICHELLE     Procedure:     2D Echo/Doppler/Color Doppler Complete.  Indications:   Unspecified atrial fibrillation - I48.91; Unspecified   atrial                 flutter - I48.92; Dyspnea, unspecified - R06.00  Diagnosis:     Cardiomyopathy, unspecified - I42.9  Study Details: Technically suboptimal study. Study quality was adversely                 affected due to lung interference.           2D AND M-MODE MEASUREMENTS (normal ranges within parentheses):  Left Ventricle:                 Normal    Aorta/Left Atrium:           Normal  IVSd (2D):              1.00 cm (0.7-1.1) Aortic Root (2D):  2.43 cm   (2.4-3.7)  LVPWd (2D):             1.07 cm (0.7-1.1) Left Atrium (2D):  3.56 cm   (1.9-4.0)  LVIDd (2D):             5.88 cm (3.4-5.7) Right Ventricle:  LVIDs (2D):             5.05 cm           TAPSE:           1.21 cm  LV FS (2D):             14.1 %  (>25%)  Relative Wall Thickness 0.36    (<0.42)    LV DIASTOLIC FUNCTION:  Decel Time: 175 msec     SPECTRAL DOPPLER ANALYSIS (where applicable):  Mitral Valve:  MV P1/2 Time: 50.65 msec  MV Area, PHT: 4.34 cm²     Aortic Valve: AoV Max Dg: 0.85 m/s AoV Peak P.9 mmHg AoV Mean P.0 mmHg     LVOT Vmax: 0.65 m/s LVOT VTI: 0.104 m LVOT Diameter:     Tricuspid Valve and PA/RV Systolic Pressure: TR Max Velocity: 2.62 m/s   RA Pressure: 5 mmHg RVSP/PASP: 32.5 mmHg        PHYSICIAN INTERPRETATION:  Left Ventricle: The left ventricular internal cavity size is mild to   moderately increased.  Left ventricular ejection fraction, by visual estimation, is 20 to 25%.   There is global hypokinesis; EF about 25%.  Right Ventricle: The right ventricular size is normal. RV systolic   function is mildly reduced.  Left Atrium: The left atrium is normal in size.  Right Atrium: The right atrium is normal in size.  Pericardium: There is no evidence of pericardial effusion.  Mitral Valve: Thickening of the anterior and posterior mitral valve   leaflets. Mild to moderate mitral valve regurgitation is seen.  Tricuspid Valve: Mild tricuspid regurgitation is visualized.  Pulmonic Valve: The pulmonic valve was not well visualized.  Aorta: The aortic root is normal in size and structure.        Summary:   1. Left ventricular ejection fraction, by visual estimation, is 20 to   25%.   2. Thickening of the anterior and posterior mitral valve leaflets.     [id number] Matthew Daugherty MD  Electronically signed on 3/27/2017 at 4:48:31 PM       MATTHEW DAUGHERTY M.D.; Attending Cardiologist  This document has been electronically signed. Mar 27 2017  2:40PM      ASSESSMENT AND PLAN:  ·	SOB.  ·	Pulmonary embolism.  ·	Systolic CHF.  ·	Severe LV dusfunction.  ·	CAD with stent.  ·	A Fib chronic.  ·	Possible COPD component.  ·	Non adherence with treatment.    On full anticoagulation with Xarelto.  Continue management for severe Cardiomyopathy.  Rate control with PRN diuretics.  Further work up per Cardiology.

## 2017-03-28 NOTE — CONSULT NOTE ADULT - SUBJECTIVE AND OBJECTIVE BOX
MEDICAL RECORD REVIEWED; HISTORY AND  REVIEW OF SYSTEMS OBTAINED; PATIENT EXAMINED:    72 YEAR OLD  MALE NON COMPLIANT WITH MEDICATIONS WITH PULMONARY EMBOLII, CARDIOMYOPATHY WITH EF 20% ON TTE, CHRONIC ATRIAL FIBRILLATION; PRIOR PCI 2015 FOR ASHD; FOLLOWED BY DR PATEL IN [place] FOR CARDIOLOGY; ALL LABS/RADIOLOGY/MEDICATIONS REVIEWED; EQUIVOCAL TROPONINS, ECGS AFIB WITH DIFFUSE T WAVE ABNORMALITIES; CXR: CARDIOMEGALY WITH MILD REDISTRIBUTION CHANGES; CTA: + PULMONARY EMBOLII, DISTALLY; ON EXAM: NON LABORED BREATHING; NO JVD; SOFT S1, GOOD AIR ENTRY , NON TENDER ABDOMEN, NO CLUBBING CYANOSIS OR EDEMA    IMPRESSION:    PULMONARY EMBOLISM, ACUTE  CARDIOMYOPATHY, CHRONIC SUSPECTED: ABNORMAL ECG (OLD V NEW?) ; LV DYSFUNCTION SEEMS OUT OF PROPORTION TO LOW LEVEL OF TROPONIN  CHRONIC ATRIAL FIBRILLATION    AGREE WITH PRESENT MANAGEMENT ; RATE CONTROL, ACEI, PRN DIURESIS, ANTICOAGULATION.  MEDICAL COMPLIANCE STRESSED;  WHEN MEDICALLY /RESPIRATORY STATUS AND AFIB STABLE,  ISCHEMIA EVALUTAION WITH NUCLEAR STRESS TEST MOST  LIKELY OVER CARDIAC CATH IN VIEW OF ABSENCE OF ANGINA AND SUSPECTED CHRONIC NATURE OF HIS CONDITION AND FINDINGS    KEATON DYER MD, FACC
Patient is a 72y old  Male who presents with a chief complaint of dyspnea .    HPI:  72m hx of A.fib noncompliant with xarelto and CAD with stent,  not on any meds came with sob that he has had for 2 years. That is steadily worsening for last few weeks. Reports having Orthopnea. Denies any fever , chills. Some times has chest discomfort. Admitted with PE.    PAST MEDICAL & SURGICAL HISTORY:  Atrial fibrillation  CAD with stent.  S/P hernia repair  S/P appendectomy    FAMILY HISTORY:  No pertinent family history in first degree relatives    SOCIAL HISTORY: BMI (kg/m2): 26.6 . non smoker.    Allergies  No Known Allergies    MEDICATIONS  (STANDING):  aspirin enteric coated 81milliGRAM(s) Oral daily  lisinopril 2.5milliGRAM(s) Oral daily  digoxin     Tablet 0.25milliGRAM(s) Oral daily  diltiazem    Tablet 60milliGRAM(s) Oral three times a day  rivaroxaban 20milliGRAM(s) Oral daily  atorvastatin 20milliGRAM(s) Oral at bedtime  clopidogrel Tablet 75milliGRAM(s) Oral daily  carvedilol 6.25milliGRAM(s) Oral every 12 hours    MEDICATIONS  (PRN):  ALPRAZolam 0.25milliGRAM(s) Oral daily PRN anxiety    REVIEW OF SYSTEMS:    Constitutional:            No fever, weight loss or fatigue  HEENT:                      No difficulty hearing, tinnitus, vertigo; No sinus or throat pain  Respiratory:               sob.  Cardiovascular:         occasional chest discomfort.  Gastrointestinal:        No abdominal or epigastric pain. No N/V/diarrhea or hematemesis  Genitourinary:            No dysuria, frequency, hematuria or incontinence  SKIN:                             no rash  Musculoskeletal:        No joint pain or swelling  Extremities:                No swelling  Neurological:              No headaches  Psychiatric:                 No depression, anxiety    Vital Signs Last 24 Hrs  T(C): 36.5, Max: 36.5 (- @ 16:22)  T(F): 97.7, Max: 97.7 (- @ 16:22)  HR: 117 (51 - 117)  BP: 136/93 (114/80 - 136/93)  BP(mean): --  RR: 19 (18 - 23)  SpO2: 98% (98% - 98%)    PHYSICAL EXAM:  GEN:         Awake, responsive and comfortable.  HEENT:    Normal.    RESP:      clear.  CVS:          Regular rate and rhythm.   ABD:         Soft, non-tender, non-distended;   :             No costovertebral angle tenderness  SKIN:           Warm and dry.  EXTR:            No clubbing, cyanosis or edema  CNS:              Intact sensory and motor function.  PSYCH:        cooperative, no anxiety or depression    LABS:                        16.3   7.4   )-----------( 188      ( 26 Mar 2017 20:13 )             48.3     26 Mar 2017 13:47    144    |  109    |  26     ----------------------------<  106    4.0     |  28     |  1.12     Ca    8.5        26 Mar 2017 13:47  Mg     2.3       26 Mar 2017 13:47    TPro  6.3    /  Alb  3.3    /  TBili  0.7    /  DBili  x      /  AST  31     /  ALT  50     /  AlkPhos  72     26 Mar 2017 13:47    PT/INR - ( 26 Mar 2017 13:47 )   PT: 25.4 sec;   INR: 2.29 ratio       PTT - ( 26 Mar 2017 13:47 )  PTT:46.1 sec  ABG -  @ 13:49  pH: 7.45 / pcO2: 35 / pO2: 76 room air.    Urinalysis Basic - ( 26 Mar 2017 15:34 )    Color: Yellow / Appearance: Slightly Turbid / S.020 / pH: x  Gluc: x / Ketone: Negative  / Bili: Negative / Urobili: Negative mg/dL   Blood: x / Protein: 30 mg/dL / Nitrite: Negative   Leuk Esterase: Negative / RBC: >50 /HPF / WBC 0-2   Sq Epi: x / Non Sq Epi: Occasional / Bacteria: Few    EKG: A FIB.    RADIOLOGY & ADDITIONAL STUDIES:    EXAM:  CT ANGIO CHEST (W)AW IC                            PROCEDURE DATE:  2017        INTERPRETATION:  CLINICAL INFORMATION: Shortness of breath    COMPARISON: 3/25/2014    PROCEDURE:     Serial axial sections through the chest were obtainedfollowing   administration of nonionic intravenous contrast utilizing pulmonary   embolism protocol. Sagittal and coronal reformats were then generated   from the axial images. 3-D maximal intensity projection reconstructions   were performed on an independent workstation.    90 mls of Omnipaque 350 was administered intravenously without   complication and 10 mls were discarded.    FINDINGS:     PULMONARY ARTERIES: The bolus is of good quality for diagnosis of   pulmonary embolism. Multiple images are degraded by respiratory motion.   There are subsegmental pulmonary arterial filling defects identified   within upper and lower lobe branches of the right pulmonary artery.    LUNG AND LARGE AIRWAYS: Mild septal thickening groundglass opacity.   Bilateral dependent atelectasis.  PLEURA: Small bilateral pleural effusions.  VESSELS: Atherosclerotic changes of the aorta and coronary arteries.  HEART: Enlarged. No pericardial effusion.  MEDIASTINUM AND JIM: Mild mediastinal and hilar adenopathy.  CHEST WALL AND LOWER NECK: Bilateral gynecomastia.    UPPER ABDOMEN: Gallbladder wall fat deposition. Reflux of intravenous   contrast into the hepatic veins and inferior vena cava. Left renal   calculi measuring up to 12 mm. Bilateral nonspecific perinephric   stranding.    BONES: Shoulder and spine degenerative changes.    IMPRESSION:     Positive for subsegmental pulmonary arterial thromboemboli in the right   upper and lower lobes.  Small bilateral pleural effusions.  Lung findings suggestive of interstitial edema.    Findings were discussed with Dr. Quiñones by telephone on 3/26/2017 at   1605 hours.    LILO LÓPEZ M.D., ATTENDING RADIOLOGIST  This document has been electronically signed. Mar 26 2017  4:05PM      ASSESSMENT AND PLAN:  ·	SOB.  ·	Pulmonary embolism.  ·	Suspect CHF.  ·	CAD with stent.  ·	A Fib.  ·	Possible COPD component.  ·	Non adherence with treatment.    Continue anticoagulation.  Follow echocardiogram.  PFT out Pt.

## 2017-03-29 LAB
ANION GAP SERPL CALC-SCNC: 10 MMOL/L — SIGNIFICANT CHANGE UP (ref 5–17)
BUN SERPL-MCNC: 26 MG/DL — HIGH (ref 7–23)
CALCIUM SERPL-MCNC: 8 MG/DL — LOW (ref 8.5–10.1)
CHLORIDE SERPL-SCNC: 107 MMOL/L — SIGNIFICANT CHANGE UP (ref 96–108)
CO2 SERPL-SCNC: 29 MMOL/L — SIGNIFICANT CHANGE UP (ref 22–31)
CREAT SERPL-MCNC: 1.18 MG/DL — SIGNIFICANT CHANGE UP (ref 0.5–1.3)
GLUCOSE SERPL-MCNC: 104 MG/DL — HIGH (ref 70–99)
HCT VFR BLD CALC: 43.6 % — SIGNIFICANT CHANGE UP (ref 39–50)
HGB BLD-MCNC: 14.8 G/DL — SIGNIFICANT CHANGE UP (ref 13–17)
MCHC RBC-ENTMCNC: 31.8 PG — SIGNIFICANT CHANGE UP (ref 27–34)
MCHC RBC-ENTMCNC: 34.1 GM/DL — SIGNIFICANT CHANGE UP (ref 32–36)
MCV RBC AUTO: 93.2 FL — SIGNIFICANT CHANGE UP (ref 80–100)
PLATELET # BLD AUTO: 179 K/UL — SIGNIFICANT CHANGE UP (ref 150–400)
POTASSIUM SERPL-MCNC: 4.1 MMOL/L — SIGNIFICANT CHANGE UP (ref 3.5–5.3)
POTASSIUM SERPL-SCNC: 4.1 MMOL/L — SIGNIFICANT CHANGE UP (ref 3.5–5.3)
RBC # BLD: 4.67 M/UL — SIGNIFICANT CHANGE UP (ref 4.2–5.8)
RBC # FLD: 13.1 % — SIGNIFICANT CHANGE UP (ref 11–15)
SODIUM SERPL-SCNC: 146 MMOL/L — HIGH (ref 135–145)
WBC # BLD: 6.8 K/UL — SIGNIFICANT CHANGE UP (ref 3.8–10.5)
WBC # FLD AUTO: 6.8 K/UL — SIGNIFICANT CHANGE UP (ref 3.8–10.5)

## 2017-03-29 PROCEDURE — 78452 HT MUSCLE IMAGE SPECT MULT: CPT | Mod: 26

## 2017-03-29 RX ORDER — REGADENOSON 0.08 MG/ML
0.4 INJECTION, SOLUTION INTRAVENOUS ONCE
Qty: 0 | Refills: 0 | Status: COMPLETED | OUTPATIENT
Start: 2017-03-29 | End: 2017-03-29

## 2017-03-29 RX ADMIN — LISINOPRIL 2.5 MILLIGRAM(S): 2.5 TABLET ORAL at 09:01

## 2017-03-29 RX ADMIN — RIVAROXABAN 20 MILLIGRAM(S): KIT at 14:46

## 2017-03-29 RX ADMIN — Medication 81 MILLIGRAM(S): at 14:46

## 2017-03-29 RX ADMIN — Medication 0.12 MILLIGRAM(S): at 09:05

## 2017-03-29 RX ADMIN — ATORVASTATIN CALCIUM 20 MILLIGRAM(S): 80 TABLET, FILM COATED ORAL at 21:56

## 2017-03-29 RX ADMIN — REGADENOSON 0.4 MILLIGRAM(S): 0.08 INJECTION, SOLUTION INTRAVENOUS at 12:07

## 2017-03-29 NOTE — PROGRESS NOTE ADULT - SUBJECTIVE AND OBJECTIVE BOX
INTERVAL HPI/OVERNIGHT EVENTS:  72m hx of FABIANAfib noncompliant with xarelto and CAD with stent,  not on any meds came with sob that he has had for 2 years. That is steadily worsening for last few weeks. Reports having Orthopnea. Denies any fever , chills. Some times has chest discomfort. Admitted with PE.  Seen by Cardiology.  Comfortable in bed , complains of exertional sob. Had stress test today.    Vital Signs Last 24 Hrs  T(C): 37, Max: 37 (03-29 @ 13:42)  T(F): 98.6, Max: 98.6 (03-29 @ 13:42)  HR: 87 (67 - 106)  BP: 117/93 (108/82 - 144/98)  BP(mean): --  RR: 20 (18 - 20)  SpO2: 97% (96% - 97%)    PHYSICAL EXAM:  GEN:        Awake, responsive and comfortable.  HEENT:    Normal.    RESP:     no wheezing.  CVS:         Regular rate and rhythm.   ABD:         Soft, non-tender, non-distended;   :           No costovertebral angle tenderness  EXTR:         No clubbing, cyanosis or edema  CNS:           Intact sensory and motor function.    MEDICATIONS  (STANDING):  aspirin enteric coated 81milliGRAM(s) Oral daily  lisinopril 2.5milliGRAM(s) Oral daily  rivaroxaban 20milliGRAM(s) Oral daily  atorvastatin 20milliGRAM(s) Oral at bedtime  carvedilol 6.25milliGRAM(s) Oral every 12 hours  diltiazem    Tablet 30milliGRAM(s) Oral three times a day  digoxin     Tablet 0.125milliGRAM(s) Oral daily    MEDICATIONS  (PRN):  ALPRAZolam 0.25milliGRAM(s) Oral daily PRN anxiety    LABS:                        14.8   6.8   )-----------( 179      ( 29 Mar 2017 06:11 )             43.6     29 Mar 2017 06:11    146    |  107    |  26     ----------------------------<  104    4.1     |  29     |  1.18     Ca    8.0        29 Mar 2017 06:11    ASSESSMENT AND PLAN:  ·	SOB better.  ·	Pulmonary embolism.  ·	Systolic CHF.  ·	Severe LV  dysfunction.  ·	CAD with stent.  ·	A Fib chronic.  ·	Possible COPD component.  ·	Non adherence with treatment.    Continue treatment.  Follow stress test results.  On Xarelto and Carvedilol.

## 2017-03-29 NOTE — PROGRESS NOTE ADULT - SUBJECTIVE AND OBJECTIVE BOX
PATIENT SAYS HE IS URINATING FREQUENTLY; NOT ON DIURETICS  NO JVD  GOOD AIR ENTRY ; CLEAR LUNG FIELDS  SOFT S1  NO EDEMA    IMPRESSION:    CARDIOMYOPATHY  PULMONARY EMBOLISM  EQUIVOCAL TROPONIN  ASHD WITH REMOTE PCI    ON XARELTO  DC PLAVIX  CONTINUE ASPIRIN  PHARMACOLOGICAL NUCLEAR STRESS TEST ORDERED

## 2017-03-29 NOTE — PROGRESS NOTE ADULT - SUBJECTIVE AND OBJECTIVE BOX
Patient is a 72y old  Male who presents with a chief complaint of dyspnea (26 Mar 2017 16:59)      INTERVAL HPI/OVERNIGHT EVENTS:    MEDICATIONS  (STANDING):  aspirin enteric coated 81milliGRAM(s) Oral daily  lisinopril 2.5milliGRAM(s) Oral daily  rivaroxaban 20milliGRAM(s) Oral daily  atorvastatin 20milliGRAM(s) Oral at bedtime  carvedilol 6.25milliGRAM(s) Oral every 12 hours  diltiazem    Tablet 30milliGRAM(s) Oral three times a day  digoxin     Tablet 0.125milliGRAM(s) Oral daily  regadenoson Injectable 0.4milliGRAM(s) IV Push once    MEDICATIONS  (PRN):  ALPRAZolam 0.25milliGRAM(s) Oral daily PRN anxiety      Allergies    No Known Allergies    Intolerances        REVIEW OF SYSTEMS:  CONSTITUTIONAL: No fever, weight loss, or fatigue  EYES: No eye pain, visual disturbances, or discharge  ENMT:  No difficulty hearing, tinnitus, vertigo; No sinus or throat pain  NECK: No pain or stiffness  BREASTS: No pain, masses, or nipple discharge  RESPIRATORY: No cough, wheezing, chills or hemoptysis; No shortness of breath  CARDIOVASCULAR: No chest pain, palpitations, dizziness, or leg swelling  GASTROINTESTINAL: No abdominal or epigastric pain. No nausea, vomiting, or hematemesis; No diarrhea or constipation. No melena or hematochezia.  GENITOURINARY: No dysuria, frequency, hematuria, or incontinence  NEUROLOGICAL: No headaches, memory loss, loss of strength, numbness, or tremors  SKIN: No itching, burning, rashes, or lesions   LYMPH NODES: No enlarged glands  ENDOCRINE: No heat or cold intolerance; No hair loss  MUSCULOSKELETAL: No joint pain or swelling; No muscle, back, or extremity pain  PSYCHIATRIC: No depression, anxiety, mood swings, or difficulty sleeping  HEME/LYMPH: No easy bruising, or bleeding gums  ALLERY AND IMMUNOLOGIC: No hives or eczema    Vital Signs Last 24 Hrs  T(C): 36.4, Max: 36.8 (03-28 @ 13:10)  T(F): 97.6, Max: 98.2 (03-28 @ 13:10)  HR: 106 (59 - 106)  BP: 144/98 (108/82 - 144/98)  BP(mean): --  RR: 20 (18 - 20)  SpO2: 97% (96% - 98%)    PHYSICAL EXAM:  GENERAL: NAD, well-groomed, well-developed. ambulatory without distress  HEAD:  Atraumatic, Normocephalic  EYES: EOMI, PERRLA, conjunctiva and sclera clear  ENMT: No tonsillar erythema, exudates, or enlargement; Moist mucous membranes, Good dentition, No lesions  NECK: Supple, No JVD, Normal thyroid  NERVOUS SYSTEM:  Alert & Oriented X3, Good concentration; Motor Strength 5/5 B/L upper and lower extremities; DTRs 2+ intact and symmetric  CHEST/LUNG: Clear to percussion bilaterally; No rales, rhonchi, wheezing, or rubs  HEART: Regular rate and rhythm; No murmurs, rubs, or gallops  ABDOMEN: Soft, Nontender, Nondistended; Bowel sounds present  EXTREMITIES:  2+ Peripheral Pulses, No clubbing, cyanosis, or edema  LYMPH: No lymphadenopathy noted  SKIN: No rashes or lesions    LABS:                        14.8   6.8   )-----------( 179      ( 29 Mar 2017 06:11 )             43.6     29 Mar 2017 06:11    146    |  107    |  26     ----------------------------<  104    4.1     |  29     |  1.18     Ca    8.0        29 Mar 2017 06:11            CAPILLARY BLOOD GLUCOSE                RADIOLOGY & ADDITIONAL TESTS:    Imaging Personally Reviewed:  [ ] YES  [ ] NO    Consultant(s) Notes Reviewed:  [ ] YES  [ ] NO    Care Discussed with Consultants/Other Providers [x ] YES  [ ] NO    Care discussed with family,         [  ]   yes  [  ]  No    imp:    stable[ ]    unstable[  ]     improving [  x ]       unchanged  [  ]                Plans:  Continue present plans  [ x ] stress test today. further plana s per crdiology and Pulmonary               New consult [  ]   specialty  .......               order test[  ]    test name.                  Discharge Planning  [  ]

## 2017-03-30 LAB
HCT VFR BLD CALC: 46.5 % — SIGNIFICANT CHANGE UP (ref 39–50)
HGB BLD-MCNC: 15.8 G/DL — SIGNIFICANT CHANGE UP (ref 13–17)
MCHC RBC-ENTMCNC: 31.6 PG — SIGNIFICANT CHANGE UP (ref 27–34)
MCHC RBC-ENTMCNC: 34 GM/DL — SIGNIFICANT CHANGE UP (ref 32–36)
MCV RBC AUTO: 93.1 FL — SIGNIFICANT CHANGE UP (ref 80–100)
PLATELET # BLD AUTO: 177 K/UL — SIGNIFICANT CHANGE UP (ref 150–400)
RBC # BLD: 5 M/UL — SIGNIFICANT CHANGE UP (ref 4.2–5.8)
RBC # FLD: 13.5 % — SIGNIFICANT CHANGE UP (ref 11–15)
WBC # BLD: 6.5 K/UL — SIGNIFICANT CHANGE UP (ref 3.8–10.5)
WBC # FLD AUTO: 6.5 K/UL — SIGNIFICANT CHANGE UP (ref 3.8–10.5)

## 2017-03-30 PROCEDURE — 99233 SBSQ HOSP IP/OBS HIGH 50: CPT

## 2017-03-30 RX ORDER — DIGOXIN 250 MCG
1 TABLET ORAL
Qty: 0 | Refills: 0 | COMMUNITY
Start: 2017-03-30

## 2017-03-30 RX ADMIN — Medication 81 MILLIGRAM(S): at 12:20

## 2017-03-30 RX ADMIN — RIVAROXABAN 20 MILLIGRAM(S): KIT at 12:20

## 2017-03-30 RX ADMIN — ATORVASTATIN CALCIUM 20 MILLIGRAM(S): 80 TABLET, FILM COATED ORAL at 21:21

## 2017-03-30 NOTE — DISCHARGE NOTE ADULT - PATIENT PORTAL LINK FT
“You can access the [facility] Patient Portal, offered by [facility], by registering with the following website: [email or web address]”

## 2017-03-30 NOTE — DISCHARGE NOTE ADULT - PROVIDER TOKENS
FREE:[LAST:[Dr Cavazos],PHONE:[phone]],FAX:[(   )    -],ADDRESS:[[facility] in 1 week]]

## 2017-03-30 NOTE — DISCHARGE NOTE ADULT - CARE PLAN
Principal Discharge DX:	Other acute pulmonary embolism without acute cor pulmonale  Goal:	ON ACT with Xarelto  Instructions for follow-up, activity and diet:	as tolerated  . He needs ACT to be continued because of his A.FIB also. Follow up with dr Cavazos at [facility],[phone]  Secondary Diagnosis:	Atrial fibrillation  Goal:	ACT with Xarelto  Secondary Diagnosis:	CAD (coronary artery disease), native coronary artery  Goal:	has stent. , cardiology follow up  Secondary Diagnosis:	Ischemic cardiomyopathy  Goal:	cardiology follow up Principal Discharge DX:	Other acute pulmonary embolism without acute cor pulmonale  Goal:	ON ACT with Xarelto  Instructions for follow-up, activity and diet:	as tolerated  . He needs ACT to be continued because of his A.FIB also. Follow up with Dr Cavazos at [facility],[phone]  Secondary Diagnosis:	Atrial fibrillation  Goal:	ACT with Xarelto  Secondary Diagnosis:	CAD (coronary artery disease), native coronary artery  Goal:	has stent. , cardiology follow up  Secondary Diagnosis:	Ischemic cardiomyopathy  Goal:	cardiology follow up Principal Discharge DX:	Other acute pulmonary embolism without acute cor pulmonale  Goal:	ON ACT with Xarelto  Instructions for follow-up, activity and diet:	as tolerated  . He needs ACT to be continued because of his A.FIB also. Follow up with Dr Cavazos at [facility],[phone]  Secondary Diagnosis:	Atrial fibrillation  Goal:	ACT with Xarelto  Secondary Diagnosis:	CAD (coronary artery disease), native coronary artery  Goal:	has stent. , cardiology follow up  Secondary Diagnosis:	Ischemic cardiomyopathy  Goal:	cardiology follow up Principal Discharge DX:	Other acute pulmonary embolism without acute cor pulmonale  Goal:	ON ACT with Xarelto  Instructions for follow-up, activity and diet:	as tolerated  . He needs ACT to be continued because of his A.FIB also. [facility][phone]  Secondary Diagnosis:	Atrial fibrillation  Goal:	ACT with Xarelto  Secondary Diagnosis:	CAD (coronary artery disease), native coronary artery  Goal:	has stent. , cardiology follow up  Secondary Diagnosis:	Ischemic cardiomyopathy  Goal:	cardiology follow up

## 2017-03-30 NOTE — DISCHARGE NOTE ADULT - MEDICATION SUMMARY - MEDICATIONS TO STOP TAKING
I will STOP taking the medications listed below when I get home from the hospital:    Claritin 10 mg oral tablet  -- 1 tab(s) by mouth once a day - hosp

## 2017-03-30 NOTE — DISCHARGE NOTE ADULT - MEDICATION SUMMARY - MEDICATIONS TO TAKE
I will START or STAY ON the medications listed below when I get home from the hospital:    aspirin 81 mg oral delayed release tablet  -- 1 tab(s) by mouth once a day  -- Indication: For CAD (coronary artery disease), native coronary artery    lisinopril 2.5 mg oral tablet  -- 1 tab(s) by mouth once a day  -- Indication: For CAD (coronary artery disease), native coronary artery    diltiaZEM 30 mg oral tablet  -- 1 tab(s) by mouth 3 times a day  -- Indication: For A.fib    digoxin 125 mcg (0.125 mg) oral tablet  -- 1 tab(s) by mouth once a day  -- Indication: For A.fib    rivaroxaban 20 mg oral tablet  -- 1 tab(s) by mouth once a day  -- Indication: For PE/A.Fib    atorvastatin 20 mg oral tablet  -- 1 tab(s) by mouth once a day (at bedtime)  -- Indication: For HLD    clopidogrel 75 mg oral tablet  -- 1 tab(s) by mouth once a day  -- Indication: For CAD (coronary artery disease), native coronary artery    ALPRAZolam 0.25 mg oral tablet  -- 1 tab(s) by mouth once a day, As needed, anxiety MDD:2  -- Indication: For Anxiety as needed    carvedilol 6.25 mg oral tablet  -- 1 tab(s) by mouth every 12 hours  -- Indication: For CAD (coronary artery disease), native coronary artery

## 2017-03-30 NOTE — PROGRESS NOTE ADULT - SUBJECTIVE AND OBJECTIVE BOX
CARDIOLOGY PROGRESS NOTE (COVERAGE FOR DR. DYER)      Chief Complaint:  Patient is a 72y old  Male who presents with a chief complaint of dyspnea/ PE/ chronic A.FIB. cardiomyopathy/CAD (30 Mar 2017 11:23)      HPI: 72m hx of afib noncompliant with xarelto and cad not on any meds pw sob that he has had for 2 years that is steadily worsening. he denies cough, fever, or other uri symptoms. No cp or abd pain. Feels particularly worse when he bends over.  Noted to have ichemic cardiomyopathy, acute pulmonary emboli.     Review of Systems:  General:  No wt loss, fevers, chills, night sweats  Eyes:  Good vision, no reported pain  ENT:  No sore throat, pain, runny nose, dysphagia  CV:  No pain, palpitation hypo/hypertension  Resp:  No dyspnea, cough, tachypnea, wheezing  GI:  No pain, nausea, vomiting, diarrhea, constipation  :  No pain, bleeding, incontinence, nocturia  Muscle:  No pain, weakness  Breast:  No pain, abscess, mass, discharge  Neuro:  No weakness, tingling, memory problems  Psych:  No fatigue, insomnia, mood problems, depression  Endocrine:  No polyuria, polydipsia cold/heat intolerance  Heme:  No petechiae, ecchymosis, easy bruisability  Skin:  No rash, edema      Physical Exam:  Vital Signs:  Vital Signs Last 24 Hrs  T(C): 36.6, Max: 36.8 (03-30 @ 12:11)  T(F): 97.8, Max: 98.2 (03-30 @ 12:11)  HR: 70 (70 - 101)  BP: 110/82 (110/82 - 149/92)  RR: 18 (18 - 20)  SpO2: 96% (94% - 97%)  I & Os for 24h ending 30 Mar 2017 07:00  =============================================  IN: 420 ml / OUT: 0 ml / NET: 420 ml    I & Os for current day (as of 30 Mar 2017 16:49)  =============================================  IN: 240 ml / OUT: 0 ml / NET: 240 ml      General:  Appears stated age, well-groomed, well-nourished, no distress  HEENT:  NC/AT, patent nares w/ pink mucosa, OP clear w/o lesions, EOMI, conjunctivae clear, no thyromegaly, no JVD  Chest:  Full & symmetric excursion, no increased effort, breath sounds clear  Cardiovascular:  Regular rhythm, S1, S2, no murmur/rub/S3/S4, no carotid bruit, radial/pedal pulses 2+  Abdomen:  Soft, non-tender, non-distended, normoactive bowel sounds  Extremities: no edema  Skin:  No rash/erythema. Skin is warm/dry  Musculoskeletal:  Full ROM in all joints w/o swelling/tenderness/effusion  Neuro/Psych:  Alert, oriented    Laboratory:                            15.8   6.5   )-----------( 177      ( 30 Mar 2017 07:31 )             46.5     29 Mar 2017 06:11    146    |  107    |  26     ----------------------------<  104    4.1     |  29     |  1.18     Ca    8.0        29 Mar 2017 06:11      Imaging:  EXAM:  CT ANGIO CHEST (W)AW IC                        PROCEDURE DATE:  03/26/2017     Positive for subsegmental pulmonary arterial thromboemboli in the right   upper and lower lobes.  Small bilateral pleural effusions.  Lung findings suggestive of interstitial edema.      EXAM:  NM NUCLEAR STRESS SNGL PHARM                        PROCEDURE DATE:  03/29/2017    IMPRESSION: Abnormal myocardial perfusion scan.    1. Findings appear suggestive of cardiomyopathy, possibly ischemic.   Evidence of RCA territory infarct and possible LAD territory ischemia   present, findings may be accentuated due to significant motion artifact   noted during course of study. Clinical correlation highly suggested.    2. There is severely diminished left ventricular contractility, and   ejection fraction . Overall post stress ejection fraction was 20 %       Assessment: 72m hx of afib noncompliant with xarelto and cad not on any meds pw sob that he has had for 2 years that is steadily worsening. he denies cough, fever, or other uri symptoms. No cp or abd pain. Feels particularly worse when he bends over.  Noted to have ischemic cardiomyopathy, acute pulmonary emboli.     Plan:   No plans for urgent cardiac cath at present unless pt decompensates or has further evidence of active ischemia.  Would con't with:    aspirin enteric coated 81milliGRAM(s) Oral daily  lisinopril 2.5milliGRAM(s) Oral daily  rivaroxaban 20milliGRAM(s) Oral daily  atorvastatin 20milliGRAM(s) Oral at bedtime  carvedilol 6.25milliGRAM(s) Oral every 12 hours  diltiazem    Tablet 30milliGRAM(s) Oral three times a day  digoxin     Tablet 0.125milliGRAM(s) Oral daily    supportive care and d/c planning if HDS and feeling better.    Diogo Dickerson MD, FACC, FASJENNIFER, FASNC, FACP  Director, Heart Failure Services  [facility]  , Department of Cardiology  [facility] [employer] CARDIOLOGY PROGRESS NOTE (COVERAGE FOR DR. DYER)      Chief Complaint:  Patient is a 72y old  Male who presents with a chief complaint of dyspnea/ PE/ chronic A.FIB. cardiomyopathy/CAD (30 Mar 2017 11:23)      HPI: 72m hx of afib noncompliant with xarelto and cad not on any meds pw sob that he has had for 2 years that is steadily worsening. he denies cough, fever, or other uri symptoms. No cp or abd pain. Feels particularly worse when he bends over.  Noted to have ichemic cardiomyopathy, acute pulmonary emboli. Feels much better. Does not want coreg or diuretics now.    Review of Systems:  General:  No wt loss, fevers, chills, night sweats  Eyes:  Good vision, no reported pain  ENT:  No sore throat, pain, runny nose, dysphagia  CV:  No pain, palpitation hypo/hypertension  Resp:  No dyspnea, cough, tachypnea, wheezing  GI:  No pain, nausea, vomiting, diarrhea, constipation  :  No pain, bleeding, incontinence, nocturia  Muscle:  No pain, weakness  Breast:  No pain, abscess, mass, discharge  Neuro:  No weakness, tingling, memory problems  Psych:  No fatigue, insomnia, mood problems, depression  Endocrine:  No polyuria, polydipsia cold/heat intolerance  Heme:  No petechiae, ecchymosis, easy bruisability  Skin:  No rash, edema      Physical Exam:  Vital Signs:  Vital Signs Last 24 Hrs  T(C): 36.6, Max: 36.8 (03-30 @ 12:11)  T(F): 97.8, Max: 98.2 (03-30 @ 12:11)  HR: 70 (70 - 101)  BP: 110/82 (110/82 - 149/92)  RR: 18 (18 - 20)  SpO2: 96% (94% - 97%)  I & Os for 24h ending 30 Mar 2017 07:00  =============================================  IN: 420 ml / OUT: 0 ml / NET: 420 ml    I & Os for current day (as of 30 Mar 2017 16:49)  =============================================  IN: 240 ml / OUT: 0 ml / NET: 240 ml      General:  Appears stated age, well-groomed, well-nourished, no distress  HEENT:  NC/AT, patent nares w/ pink mucosa, OP clear w/o lesions, EOMI, conjunctivae clear, no thyromegaly, no JVD  Chest:  Full & symmetric excursion, no increased effort, breath sounds clear  Cardiovascular:  Regular rhythm, S1, S2, no murmur/rub/S3/S4, no carotid bruit, radial/pedal pulses 2+  Abdomen:  Soft, non-tender, non-distended, normoactive bowel sounds  Extremities: no edema  Skin:  No rash/erythema. Skin is warm/dry  Musculoskeletal:  Full ROM in all joints w/o swelling/tenderness/effusion  Neuro/Psych:  Alert, oriented    Laboratory:                            15.8   6.5   )-----------( 177      ( 30 Mar 2017 07:31 )             46.5     29 Mar 2017 06:11    146    |  107    |  26     ----------------------------<  104    4.1     |  29     |  1.18     Ca    8.0        29 Mar 2017 06:11      Imaging:  EXAM:  CT ANGIO CHEST (W)AW IC                        PROCEDURE DATE:  03/26/2017     Positive for subsegmental pulmonary arterial thromboemboli in the right   upper and lower lobes.  Small bilateral pleural effusions.  Lung findings suggestive of interstitial edema.      EXAM:  NM NUCLEAR STRESS SNGL PHARM                        PROCEDURE DATE:  03/29/2017    IMPRESSION: Abnormal myocardial perfusion scan.    1. Findings appear suggestive of cardiomyopathy, possibly ischemic.   Evidence of RCA territory infarct and possible LAD territory ischemia   present, findings may be accentuated due to significant motion artifact   noted during course of study. Clinical correlation highly suggested.    2. There is severely diminished left ventricular contractility, and   ejection fraction . Overall post stress ejection fraction was 20 %       Assessment: 72m hx of afib noncompliant with xarelto and cad not on any meds pw sob that he has had for 2 years that is steadily worsening. he denies cough, fever, or other uri symptoms. No cp or abd pain. Feels particularly worse when he bends over.  Noted to have ischemic cardiomyopathy, acute pulmonary emboli.     Plan:   No plans for urgent cardiac cath at present unless pt decompensates or has further evidence of active ischemia.  Would con't with:    aspirin enteric coated 81milliGRAM(s) Oral daily  lisinopril 2.5milliGRAM(s) Oral daily  rivaroxaban 20milliGRAM(s) Oral daily  atorvastatin 20milliGRAM(s) Oral at bedtime  carvedilol 6.25milliGRAM(s) Oral every 12 hours (he is refusing, will d/c)  diltiazem    Tablet 30milliGRAM(s) Oral three times a day  digoxin     Tablet 0.125milliGRAM(s) Oral daily    supportive care and d/c planning if HDS and feeling better.    Diogo Dickerson MD, FACC, FASJENNFIER, FASNC, FACP  Director, Heart Failure Services  [facility]  , Department of Cardiology  [facility]

## 2017-03-30 NOTE — PROGRESS NOTE ADULT - SUBJECTIVE AND OBJECTIVE BOX
Patient is a 72y old  Male who presents with a chief complaint of dyspnea (26 Mar 2017 16:59)  nuclear stress test done yesterday shows low LVEF 20%, possible ishemic cardiomyopatrhy. and some fixed inferoapical defects and   reverdsible mid ant/septal area.   admitted for dyspnea/ Chronic A.fib and acute PE. hx of  one cardiac stent in the past.. No chest pains. No leg or ankle edema    INTERVAL HPI/OVERNIGHT EVENTS:    MEDICATIONS  (STANDING):  aspirin enteric coated 81milliGRAM(s) Oral daily  lisinopril 2.5milliGRAM(s) Oral daily  rivaroxaban 20milliGRAM(s) Oral daily  atorvastatin 20milliGRAM(s) Oral at bedtime  carvedilol 6.25milliGRAM(s) Oral every 12 hours  diltiazem    Tablet 30milliGRAM(s) Oral three times a day  digoxin     Tablet 0.125milliGRAM(s) Oral daily    MEDICATIONS  (PRN):  ALPRAZolam 0.25milliGRAM(s) Oral daily PRN anxiety      Allergies    No Known Allergies    Intolerances        REVIEW OF SYSTEMS:  CONSTITUTIONAL: No fever, weight loss, or fatigue  EYES: No eye pain, visual disturbances, or discharge  ENMT:  No difficulty hearing, tinnitus, vertigo; No sinus or throat pain  NECK: No pain or stiffness  BREASTS: No pain, masses, or nipple discharge  RESPIRATORY: No cough, wheezing, chills or hemoptysis; No shortness of breath  CARDIOVASCULAR: No chest pain, palpitations, dizziness, or leg swelling  GASTROINTESTINAL: No abdominal or epigastric pain. No nausea, vomiting, or hematemesis; No diarrhea or constipation. No melena or hematochezia.  GENITOURINARY: No dysuria, frequency, hematuria, or incontinence  NEUROLOGICAL: No headaches, memory loss, loss of strength, numbness, or tremors  SKIN: No itching, burning, rashes, or lesions   LYMPH NODES: No enlarged glands  ENDOCRINE: No heat or cold intolerance; No hair loss  MUSCULOSKELETAL: No joint pain or swelling; No muscle, back, or extremity pain  PSYCHIATRIC: No depression, anxiety, mood swings, or difficulty sleeping  HEME/LYMPH: No easy bruising, or bleeding gums  ALLERY AND IMMUNOLOGIC: No hives or eczema    Vital Signs Last 24 Hrs  T(C): 36.4, Max: 37 (03-29 @ 13:42)  T(F): 97.6, Max: 98.6 (03-29 @ 13:42)  HR: 86 (86 - 98)  BP: 140/96 (117/93 - 149/92)  BP(mean): --  RR: 20 (18 - 20)  SpO2: 97% (94% - 97%)    PHYSICAL EXAM:  GENERAL: NAD, well-groomed, well-developed  HEAD:  Atraumatic, Normocephalic  EYES: EOMI, PERRLA, conjunctiva and sclera clear  ENMT: No tonsillar erythema, exudates, or enlargement; Moist mucous membranes, Good dentition, No lesions  NECK: Supple, No JVD, Normal thyroid  NERVOUS SYSTEM:  Alert & Oriented X3, Good concentration; Motor Strength 5/5 B/L upper and lower extremities; DTRs 2+ intact and symmetric  CHEST/LUNG: Clear to percussion bilaterally; No rales, rhonchi, wheezing, or rubs  HEART: Regular rate and rhythm; No murmurs, rubs, or gallops  ABDOMEN: Soft, Nontender, Nondistended; Bowel sounds present  EXTREMITIES:  2+ Peripheral Pulses, No clubbing, cyanosis, or edema  LYMPH: No lymphadenopathy noted  SKIN: No rashes or lesions    LABS:                        15.8   6.5   )-----------( 177      ( 30 Mar 2017 07:31 )             46.5     29 Mar 2017 06:11    146    |  107    |  26     ----------------------------<  104    4.1     |  29     |  1.18     Ca    8.0        29 Mar 2017 06:11            CAPILLARY BLOOD GLUCOSE                RADIOLOGY & ADDITIONAL TESTS:    Imaging Personally Reviewed:  [ ] YES  [ ] NO    Consultant(s) Notes Reviewed:  [ ] YES  [ ] NO    Care Discussed with Consultants/Other Providers [ ] YES  [ ] NO    Care discussed with family,         [  ]   yes  [  ]  No    imp:    stable[ ]    unstable[  ]     improving [  x ]       unchanged  [  ]                Plans:  Continue present plans  [ x ] cardiology to evaluate patient for filan disposition plans. ? cardiac cath               New consult [  ]   specialty  .......               order test[  ]    test name.                  Discharge Planning  [ x ]

## 2017-03-30 NOTE — DISCHARGE NOTE ADULT - HOSPITAL COURSE
patient was admitted with dyspnea. He has hx of CAD with one stent and low LVEF. He was non compliant with his meds and ACT. He was found to have chronic A.Fib and acute PE,  subsegmental at the bases. His meds including Xarelto was continued and he was seen by Pulmonary ( Dr Buitrago) and cardiology ( Dr Serna). His TTe showed apical hypokinesis with low LVEF and Nuclear stress showed  Low LVEF of 20%. hE HAD SOME FIXED APICAL PERFUSION DEFECT AND SOME REVERSIBLE SEPTAL DEFECT. THE STUDY WAS HOWEVER SUBOPTIMAL. patient was admitted with dyspnea. He has hx of CAD with one stent and low LVEF. He was non compliant with his meds and ACT. He was found to have chronic A.Fib and acute PE,  subsegmental at the bases. His meds including Xarelto was continued and he was seen by Pulmonary ( Dr Buitrago) and cardiology ( Dr Serna). His TTe showed apical hypokinesis with low LVEF and Nuclear stress showed  Low LVEF of 20%. hE HAD SOME FIXED APICAL PERFUSION DEFECT AND SOME REVERSIBLE SEPTAL DEFECT. THE STUDY WAS HOWEVER SUBOPTIMAL.Because  patient had acute PE, cardiology deferred  cardiac cath to a later date as OP. He was cleared for discharge by cardiology.

## 2017-03-30 NOTE — PROGRESS NOTE ADULT - SUBJECTIVE AND OBJECTIVE BOX
INTERVAL HPI/OVERNIGHT EVENTS:  72m hx of FABIANAfib noncompliant with xarelto and CAD with stent,  not on any meds came with sob that he has had for 2 years. That is steadily worsening for last few weeks. Reports having Orthopnea. Denies any fever , chills. Some times has chest discomfort. Admitted with PE.  Seen by Cardiology.  Comfortable in bed , complains of exertional sob. Had stress test today.    Vital Signs Last 24 Hrs  T(C): 36.8, Max: 36.8 (03-30 @ 12:11)  T(F): 98.2, Max: 98.2 (03-30 @ 12:11)  HR: 101 (86 - 101)  BP: 114/85 (114/85 - 149/92)  BP(mean): --  RR: 20 (18 - 20)  SpO2: 97% (94% - 97%)    PHYSICAL EXAM:  GEN:         Awake, responsive and comfortable.  HEENT:    Normal.    RESP:       no wheezing.  CVS:           Regular rate and rhythm.   ABD:         Soft, non-tender, non-distended;   :             No costovertebral angle tenderness  EXTR:            No clubbing, cyanosis or edema  CNS:              Intact sensory and motor function.    MEDICATIONS  (STANDING):  aspirin enteric coated 81milliGRAM(s) Oral daily  lisinopril 2.5milliGRAM(s) Oral daily  rivaroxaban 20milliGRAM(s) Oral daily  atorvastatin 20milliGRAM(s) Oral at bedtime  carvedilol 6.25milliGRAM(s) Oral every 12 hours  diltiazem    Tablet 30milliGRAM(s) Oral three times a day  digoxin     Tablet 0.125milliGRAM(s) Oral daily    MEDICATIONS  (PRN):  ALPRAZolam 0.25milliGRAM(s) Oral daily PRN anxiety    LABS:                        15.8   6.5   )-----------( 177      ( 30 Mar 2017 07:31 )             46.5     29 Mar 2017 06:11    146    |  107    |  26     ----------------------------<  104    4.1     |  29     |  1.18     Ca    8.0        29 Mar 2017 06:11    RADIOLOGY & ADDITIONAL STUDIES:  EXAM:  NM NUCLEAR STRESS SNGL PHARM                            PROCEDURE DATE:  03/29/2017        INTERPRETATION:  [employer] SPECT myocardial perfusion scan with SPECT   imaging    RADIOPHARMACEUTICAL: 40 mCi 99mTc-sestamibi  total    CLINICAL INFORMATION: 72year old male with cardiomyopathy,   atherosclerotic heart disease and elevated cardiac enzymes, referred to   evaluate for cardiac ischemia and cardiac function.    STRESS PROTOCOL: The patient was studied using the standard Sweetie scan   protocol. Baseline heart rate was 91 beats per minute, peak heart rate   was 100 bpm.   The resting blood pressure of 150/90 mmHg, rd to a maximum blood   pressure of 140/100 mmHg. The exercise test was stopped due to protocol   completion.    TECHNIQUE: At rest, 10 mCi of technetium-99m sestamibi was injected   intravenously and gated SPECT myocardial perfusion imaging was performed   45 minutes later. At peak stress, 30 mCi of technetium-99m sestamibi was   injected intravenously and gated SPECTmyocardial perfusion imaging was   performed. Data was reconstructed in the cardiac standard short axis,   horizontal long axis and vertical long axis projections.    FINDINGS:  The technical quality of the resting and post stress perfusion images was   extremely suboptimal due to significant motion artifact, despite motion   correction.  Review of resting and post stress myocardial scintigram revealed several   perfusion defects including a fixed inferior and inferoapical defect and   a separatepartially reversible defect seen in the mid anteroseptal wall   territory..  Gated wall motion study revealed severely diminished left ventricular   contractility with left ventricular dilatation.  Severe inferior wall hypokinesis was noted.   No paradoxical septal motion was seen.  The right ventricle appeared unremarkable.    Calculated left ventricular ejection fraction post stress was 20 % (based   on a left ventricular end-diastolic volume of 263 ml and an systolic   volume of 210 ml. Strokevolume was 53 ml.        IMPRESSION: Abnormal myocardial perfusion scan.    1. Findings appear suggestive of cardiomyopathy, possibly ischemic.   Evidence of RCA territory infarct and possible LAD territory ischemia   present, findings may be accentuated due to significant motion artifact   noted during course of study. Clinical correlation highly suggested.    2. There is severely diminished left ventricular contractility, and   ejection fraction . Overall post stress ejection fraction was 20 %     3. Please see the cardiac test report for EKG findings and symptoms   during the procedure      SULMA REY M.D.; Attending Cardiologist  This document has been electronically signed. Mar 29 2017  2:04PM      ASSESSMENT AND PLAN:  ·	SOB better.  ·	Pulmonary embolism.  ·	Systolic CHF.  ·	Severe LV  dysfunction.  ·	CAD with stent.  ·	A Fib chronic.  ·	Possible COPD component.  ·	Non adherence with treatment.    Pulmonary symptoms mostly from Cardiac reason.

## 2017-03-30 NOTE — DISCHARGE NOTE ADULT - PLAN OF CARE
ON ACT with Xarelto as tolerated  . He needs ACT to be continued because of his A.FIB also. Follow up with dr Cavazos at [facility],[phone] ACT with Xarelto has stent. , cardiology follow up cardiology follow up as tolerated  . He needs ACT to be continued because of his A.FIB also. Follow up with Dr Cavazos at [facility],[phone]

## 2017-03-31 VITALS
DIASTOLIC BLOOD PRESSURE: 76 MMHG | RESPIRATION RATE: 20 BRPM | SYSTOLIC BLOOD PRESSURE: 137 MMHG | OXYGEN SATURATION: 94 % | TEMPERATURE: 98 F | HEART RATE: 64 BPM

## 2017-03-31 LAB
HCT VFR BLD CALC: 44.6 % — SIGNIFICANT CHANGE UP (ref 39–50)
HGB BLD-MCNC: 15.1 G/DL — SIGNIFICANT CHANGE UP (ref 13–17)
MCHC RBC-ENTMCNC: 31.1 PG — SIGNIFICANT CHANGE UP (ref 27–34)
MCHC RBC-ENTMCNC: 33.9 GM/DL — SIGNIFICANT CHANGE UP (ref 32–36)
MCV RBC AUTO: 91.7 FL — SIGNIFICANT CHANGE UP (ref 80–100)
PLATELET # BLD AUTO: 186 K/UL — SIGNIFICANT CHANGE UP (ref 150–400)
RBC # BLD: 4.87 M/UL — SIGNIFICANT CHANGE UP (ref 4.2–5.8)
RBC # FLD: 13 % — SIGNIFICANT CHANGE UP (ref 11–15)
WBC # BLD: 6.4 K/UL — SIGNIFICANT CHANGE UP (ref 3.8–10.5)
WBC # FLD AUTO: 6.4 K/UL — SIGNIFICANT CHANGE UP (ref 3.8–10.5)

## 2017-03-31 RX ORDER — ATORVASTATIN CALCIUM 80 MG/1
1 TABLET, FILM COATED ORAL
Qty: 30 | Refills: 0 | OUTPATIENT
Start: 2017-03-31 | End: 2017-04-30

## 2017-03-31 RX ORDER — ASPIRIN/CALCIUM CARB/MAGNESIUM 324 MG
1 TABLET ORAL
Qty: 30 | Refills: 0 | OUTPATIENT
Start: 2017-03-31 | End: 2017-04-30

## 2017-03-31 RX ORDER — RIVAROXABAN 15 MG-20MG
1 KIT ORAL
Qty: 30 | Refills: 0 | OUTPATIENT
Start: 2017-03-31 | End: 2017-04-30

## 2017-03-31 RX ORDER — ALPRAZOLAM 0.25 MG
1 TABLET ORAL
Qty: 7 | Refills: 0 | OUTPATIENT
Start: 2017-03-31 | End: 2017-04-07

## 2017-03-31 RX ORDER — LISINOPRIL 2.5 MG/1
1 TABLET ORAL
Qty: 30 | Refills: 0 | OUTPATIENT
Start: 2017-03-31 | End: 2017-04-30

## 2017-03-31 RX ORDER — CLOPIDOGREL BISULFATE 75 MG/1
1 TABLET, FILM COATED ORAL
Qty: 30 | Refills: 0 | OUTPATIENT
Start: 2017-03-31 | End: 2017-04-30

## 2017-03-31 RX ORDER — CARVEDILOL PHOSPHATE 80 MG/1
1 CAPSULE, EXTENDED RELEASE ORAL
Qty: 60 | Refills: 0 | OUTPATIENT
Start: 2017-03-31 | End: 2017-04-30

## 2017-03-31 RX ORDER — DIGOXIN 250 MCG
1 TABLET ORAL
Qty: 30 | Refills: 0 | OUTPATIENT
Start: 2017-03-31 | End: 2017-04-30

## 2017-04-04 DIAGNOSIS — I48.2 CHRONIC ATRIAL FIBRILLATION: ICD-10-CM

## 2017-04-04 DIAGNOSIS — I25.5 ISCHEMIC CARDIOMYOPATHY: ICD-10-CM

## 2017-04-04 DIAGNOSIS — Z91.14 PATIENT'S OTHER NONCOMPLIANCE WITH MEDICATION REGIMEN: ICD-10-CM

## 2017-04-04 DIAGNOSIS — Z90.49 ACQUIRED ABSENCE OF OTHER SPECIFIED PARTS OF DIGESTIVE TRACT: ICD-10-CM

## 2017-04-04 DIAGNOSIS — F41.9 ANXIETY DISORDER, UNSPECIFIED: ICD-10-CM

## 2017-04-04 DIAGNOSIS — Z79.82 LONG TERM (CURRENT) USE OF ASPIRIN: ICD-10-CM

## 2017-04-04 DIAGNOSIS — Z95.5 PRESENCE OF CORONARY ANGIOPLASTY IMPLANT AND GRAFT: ICD-10-CM

## 2017-04-04 DIAGNOSIS — Z79.01 LONG TERM (CURRENT) USE OF ANTICOAGULANTS: ICD-10-CM

## 2017-04-04 DIAGNOSIS — I26.99 OTHER PULMONARY EMBOLISM WITHOUT ACUTE COR PULMONALE: ICD-10-CM

## 2017-04-04 DIAGNOSIS — I50.20 UNSPECIFIED SYSTOLIC (CONGESTIVE) HEART FAILURE: ICD-10-CM

## 2017-04-04 DIAGNOSIS — I25.10 ATHEROSCLEROTIC HEART DISEASE OF NATIVE CORONARY ARTERY WITHOUT ANGINA PECTORIS: ICD-10-CM

## 2017-04-04 DIAGNOSIS — E78.5 HYPERLIPIDEMIA, UNSPECIFIED: ICD-10-CM

## 2017-04-04 DIAGNOSIS — I08.1 RHEUMATIC DISORDERS OF BOTH MITRAL AND TRICUSPID VALVES: ICD-10-CM

## 2017-04-04 DIAGNOSIS — R06.01 ORTHOPNEA: ICD-10-CM

## 2017-04-07 ENCOUNTER — NON-APPOINTMENT (OUTPATIENT)
Age: 72
End: 2017-04-07

## 2017-04-07 ENCOUNTER — APPOINTMENT (OUTPATIENT)
Dept: CARDIOLOGY | Facility: CLINIC | Age: 72
End: 2017-04-07

## 2017-04-07 VITALS
HEART RATE: 74 BPM | BODY MASS INDEX: 25.62 KG/M2 | SYSTOLIC BLOOD PRESSURE: 159 MMHG | DIASTOLIC BLOOD PRESSURE: 90 MMHG | OXYGEN SATURATION: 96 % | WEIGHT: 173 LBS | HEIGHT: 69 IN

## 2017-04-07 DIAGNOSIS — I26.99 OTHER PULMONARY EMBOLISM W/OUT ACUTE COR PULMONALE: ICD-10-CM

## 2017-04-07 DIAGNOSIS — I50.9 HEART FAILURE, UNSPECIFIED: ICD-10-CM

## 2017-04-07 RX ORDER — ALPRAZOLAM 0.25 MG/1
0.25 TABLET ORAL
Refills: 0 | Status: ACTIVE | COMMUNITY

## 2017-04-13 LAB
ALBUMIN SERPL ELPH-MCNC: 4.4 G/DL
ALP BLD-CCNC: 70 U/L
ALT SERPL-CCNC: 30 U/L
ANION GAP SERPL CALC-SCNC: 15 MMOL/L
AST SERPL-CCNC: 33 U/L
BILIRUB SERPL-MCNC: 0.8 MG/DL
BUN SERPL-MCNC: 22 MG/DL
CALCIUM SERPL-MCNC: 9.8 MG/DL
CHLORIDE SERPL-SCNC: 102 MMOL/L
CHOLEST SERPL-MCNC: 198 MG/DL
CHOLEST/HDLC SERPL: 2.8 RATIO
CO2 SERPL-SCNC: 26 MMOL/L
CREAT SERPL-MCNC: 1.1 MG/DL
GLUCOSE SERPL-MCNC: 104 MG/DL
HDLC SERPL-MCNC: 70 MG/DL
LDLC SERPL CALC-MCNC: 111 MG/DL
NT-PROBNP SERPL-MCNC: 1301 PG/ML
POTASSIUM SERPL-SCNC: 4.8 MMOL/L
PROT SERPL-MCNC: 7.4 G/DL
SODIUM SERPL-SCNC: 143 MMOL/L
TRIGL SERPL-MCNC: 83 MG/DL
TSH SERPL-ACNC: 3.1 UIU/ML

## 2017-04-25 ENCOUNTER — APPOINTMENT (OUTPATIENT)
Dept: CARDIOLOGY | Facility: CLINIC | Age: 72
End: 2017-04-25

## 2017-04-25 ENCOUNTER — NON-APPOINTMENT (OUTPATIENT)
Age: 72
End: 2017-04-25

## 2017-04-25 VITALS
BODY MASS INDEX: 26.96 KG/M2 | SYSTOLIC BLOOD PRESSURE: 121 MMHG | HEIGHT: 69 IN | WEIGHT: 182 LBS | HEART RATE: 121 BPM | DIASTOLIC BLOOD PRESSURE: 83 MMHG | OXYGEN SATURATION: 94 %

## 2017-04-25 DIAGNOSIS — I48.91 UNSPECIFIED ATRIAL FIBRILLATION: ICD-10-CM

## 2017-04-25 DIAGNOSIS — I25.10 ATHEROSCLEROTIC HEART DISEASE OF NATIVE CORONARY ARTERY W/OUT ANGINA PECTORIS: ICD-10-CM

## 2017-04-25 DIAGNOSIS — I42.9 CARDIOMYOPATHY, UNSPECIFIED: ICD-10-CM

## 2017-05-19 ENCOUNTER — MEDICATION RENEWAL (OUTPATIENT)
Age: 72
End: 2017-05-19

## 2017-05-19 RX ORDER — CARVEDILOL 6.25 MG/1
6.25 TABLET, FILM COATED ORAL TWICE DAILY
Qty: 180 | Refills: 3 | Status: ACTIVE | COMMUNITY
Start: 1900-01-01 | End: 1900-01-01

## 2017-05-19 RX ORDER — ASPIRIN 81 MG/1
81 TABLET ORAL
Qty: 90 | Refills: 3 | Status: ACTIVE | COMMUNITY
Start: 1900-01-01 | End: 1900-01-01

## 2017-05-30 ENCOUNTER — APPOINTMENT (OUTPATIENT)
Dept: CARDIOLOGY | Facility: CLINIC | Age: 72
End: 2017-05-30

## 2021-06-25 NOTE — H&P ADULT. - DIAGNOSTIC AND THERAPEUTIC PLAN DISCUSSED WITH
Medication was sent over to [facility] for patient and he was updated that there was no dose change.    patient

## 2022-04-28 NOTE — H&P ADULT. - RESPIRATORY AND THORAX
Patient was educated/instructed on their diagnosis, treatment and medications prior to discharge from the clinic today. Patient advised to call the office with any questions or concerns.    
details…

## 2023-08-22 NOTE — ED PROVIDER NOTE - OBJECTIVE STATEMENT
Pertinent PMH/PSH/FHx/SHx and Review of Systems contained within:  72m hx of afib noncompliant with xarelto and cad not on any meds pw sob that he has had for 2 years that is steadily worsening. he denies cough, fever, or other uri symptoms. No cp or abd pain. Feels particularly worse when he bends over.   Fh and Sh not otherwise contributory  ROS otherwise negative Clindamycin Pregnancy And Lactation Text: This medication can be used in pregnancy if certain situations. Clindamycin is also present in breast milk.